# Patient Record
Sex: FEMALE | Race: WHITE | NOT HISPANIC OR LATINO | Employment: FULL TIME | URBAN - METROPOLITAN AREA
[De-identification: names, ages, dates, MRNs, and addresses within clinical notes are randomized per-mention and may not be internally consistent; named-entity substitution may affect disease eponyms.]

---

## 2018-01-07 ENCOUNTER — COMMUNICATION - HEALTHEAST (OUTPATIENT)
Dept: SCHEDULING | Facility: CLINIC | Age: 58
End: 2018-01-07

## 2018-04-02 ENCOUNTER — OFFICE VISIT - HEALTHEAST (OUTPATIENT)
Dept: CARDIOLOGY | Facility: CLINIC | Age: 58
End: 2018-04-02

## 2018-04-02 DIAGNOSIS — R07.9 CHEST PAIN, UNSPECIFIED TYPE: ICD-10-CM

## 2018-04-02 DIAGNOSIS — R01.1 MURMUR: ICD-10-CM

## 2018-04-02 DIAGNOSIS — R06.09 DYSPNEA ON EXERTION: ICD-10-CM

## 2018-04-02 RX ORDER — ALBUTEROL SULFATE 90 UG/1
1-2 AEROSOL, METERED RESPIRATORY (INHALATION) DAILY PRN
Status: SHIPPED | COMMUNITY
Start: 2018-01-08 | End: 2021-08-03

## 2018-04-02 RX ORDER — LANOLIN ALCOHOL/MO/W.PET/CERES
3 CREAM (GRAM) TOPICAL
Status: SHIPPED | COMMUNITY
Start: 2018-04-02 | End: 2021-08-03

## 2018-04-02 ASSESSMENT — MIFFLIN-ST. JEOR: SCORE: 1205.7

## 2018-04-04 LAB
ATRIAL RATE - MUSE: 56 BPM
DIASTOLIC BLOOD PRESSURE - MUSE: NORMAL MMHG
INTERPRETATION ECG - MUSE: NORMAL
P AXIS - MUSE: 53 DEGREES
PR INTERVAL - MUSE: 150 MS
QRS DURATION - MUSE: 82 MS
QT - MUSE: 444 MS
QTC - MUSE: 428 MS
R AXIS - MUSE: 24 DEGREES
SYSTOLIC BLOOD PRESSURE - MUSE: NORMAL MMHG
T AXIS - MUSE: 63 DEGREES
VENTRICULAR RATE- MUSE: 56 BPM

## 2018-05-03 ENCOUNTER — HOSPITAL ENCOUNTER (OUTPATIENT)
Dept: CARDIOLOGY | Facility: HOSPITAL | Age: 58
Discharge: HOME | End: 2018-05-03
Attending: INTERNAL MEDICINE

## 2018-05-03 ENCOUNTER — COMMUNICATION - HEALTHEAST (OUTPATIENT)
Dept: TELEHEALTH | Facility: CLINIC | Age: 58
End: 2018-05-03

## 2018-05-03 LAB
CV STRESS CURRENT BP HE: NORMAL
CV STRESS CURRENT HR HE: 102
CV STRESS CURRENT HR HE: 108
CV STRESS CURRENT HR HE: 112
CV STRESS CURRENT HR HE: 112
CV STRESS CURRENT HR HE: 113
CV STRESS CURRENT HR HE: 118
CV STRESS CURRENT HR HE: 119
CV STRESS CURRENT HR HE: 121
CV STRESS CURRENT HR HE: 122
CV STRESS CURRENT HR HE: 122
CV STRESS CURRENT HR HE: 130
CV STRESS CURRENT HR HE: 132
CV STRESS CURRENT HR HE: 133
CV STRESS CURRENT HR HE: 59
CV STRESS CURRENT HR HE: 62
CV STRESS CURRENT HR HE: 72
CV STRESS CURRENT HR HE: 73
CV STRESS CURRENT HR HE: 74
CV STRESS CURRENT HR HE: 74
CV STRESS CURRENT HR HE: 76
CV STRESS CURRENT HR HE: 83
CV STRESS CURRENT HR HE: 84
CV STRESS CURRENT HR HE: 85
CV STRESS CURRENT HR HE: 86
CV STRESS CURRENT HR HE: 91
CV STRESS CURRENT HR HE: 94
CV STRESS CURRENT HR HE: 96
CV STRESS CURRENT HR HE: 99
CV STRESS DEVIATION TIME HE: NORMAL
CV STRESS ECHO PERCENT HR HE: NORMAL
CV STRESS EXERCISE STAGE HE: NORMAL
CV STRESS FINAL RESTING BP HE: NORMAL
CV STRESS FINAL RESTING HR HE: 74
CV STRESS MAX HR HE: 134
CV STRESS MAX TREADMILL GRADE HE: 16
CV STRESS MAX TREADMILL SPEED HE: 4.2
CV STRESS PEAK DIA BP HE: NORMAL
CV STRESS PEAK SYS BP HE: NORMAL
CV STRESS PHASE HE: NORMAL
CV STRESS PROTOCOL HE: NORMAL
CV STRESS RESTING PT POSITION HE: NORMAL
CV STRESS RESTING PT POSITION HE: NORMAL
CV STRESS ST DEVIATION AMOUNT HE: NORMAL
CV STRESS ST DEVIATION ELEVATION HE: NORMAL
CV STRESS ST EVELATION AMOUNT HE: NORMAL
CV STRESS TEST TYPE HE: NORMAL
CV STRESS TOTAL STAGE TIME MIN 1 HE: NORMAL
ECHO EJECTION FRACTION ESTIMATED: 60 %
STRESS ECHO BASELINE BP: NORMAL
STRESS ECHO BASELINE HR: 60
STRESS ECHO CALCULATED PERCENT HR: 81 %
STRESS ECHO LAST STRESS BP: NORMAL
STRESS ECHO LAST STRESS HR: 133
STRESS ECHO POST ESTIMATED WORKLOAD: 11.7
STRESS ECHO POST EXERCISE DUR MIN: 10
STRESS ECHO POST EXERCISE DUR SEC: 7
STRESS ECHO TARGET HR: 140

## 2018-05-07 ENCOUNTER — AMBULATORY - HEALTHEAST (OUTPATIENT)
Dept: CARDIOLOGY | Facility: CLINIC | Age: 58
End: 2018-05-07

## 2018-05-07 DIAGNOSIS — R06.09 DOE (DYSPNEA ON EXERTION): ICD-10-CM

## 2018-05-07 DIAGNOSIS — R07.9 CHEST PAIN: ICD-10-CM

## 2018-05-07 DIAGNOSIS — R94.39 EQUIVOCAL STRESS TEST: ICD-10-CM

## 2018-05-22 ENCOUNTER — COMMUNICATION - HEALTHEAST (OUTPATIENT)
Dept: CARDIOLOGY | Facility: CLINIC | Age: 58
End: 2018-05-22

## 2018-05-24 ENCOUNTER — HOSPITAL ENCOUNTER (OUTPATIENT)
Dept: CT IMAGING | Facility: CLINIC | Age: 58
Discharge: HOME | End: 2018-05-24
Attending: INTERNAL MEDICINE

## 2018-05-24 DIAGNOSIS — R94.39 EQUIVOCAL STRESS TEST: ICD-10-CM

## 2018-05-24 DIAGNOSIS — R06.09 OTHER FORMS OF DYSPNEA: ICD-10-CM

## 2018-05-24 DIAGNOSIS — R07.9 CHEST PAIN: ICD-10-CM

## 2018-05-24 DIAGNOSIS — R06.09 DOE (DYSPNEA ON EXERTION): ICD-10-CM

## 2018-05-24 LAB
BSA FOR ECHO PROCEDURE: 1.7 M2
CREAT BLD-MCNC: 0.8 MG/DL
CV CALCIUM SCORE AGATSTON LM: 0
CV CALCIUM SCORING AGATSON LAD: 0
CV CALCIUM SCORING AGATSTON CX: 0
CV CALCIUM SCORING AGATSTON RCA: 0
CV CALCIUM SCORING AGATSTON TOTAL: 0
LEFT VENTRICLE HEART RATE: 62 BPM

## 2018-05-24 ASSESSMENT — MIFFLIN-ST. JEOR: SCORE: 1205.7

## 2018-05-31 ENCOUNTER — COMMUNICATION - HEALTHEAST (OUTPATIENT)
Dept: CARDIOLOGY | Facility: CLINIC | Age: 58
End: 2018-05-31

## 2020-07-03 ENCOUNTER — AMBULATORY - HEALTHEAST (OUTPATIENT)
Dept: FAMILY MEDICINE | Facility: CLINIC | Age: 60
End: 2020-07-03

## 2020-07-03 DIAGNOSIS — Z20.822 SUSPECTED COVID-19 VIRUS INFECTION: ICD-10-CM

## 2020-07-06 ENCOUNTER — AMBULATORY - HEALTHEAST (OUTPATIENT)
Dept: FAMILY MEDICINE | Facility: CLINIC | Age: 60
End: 2020-07-06

## 2020-07-06 DIAGNOSIS — Z20.822 SUSPECTED COVID-19 VIRUS INFECTION: ICD-10-CM

## 2020-07-11 ENCOUNTER — COMMUNICATION - HEALTHEAST (OUTPATIENT)
Dept: FAMILY MEDICINE | Facility: CLINIC | Age: 60
End: 2020-07-11

## 2020-11-12 ENCOUNTER — VIRTUAL VISIT (OUTPATIENT)
Dept: FAMILY MEDICINE | Facility: OTHER | Age: 60
End: 2020-11-12

## 2020-11-12 NOTE — PROGRESS NOTES
"Date: 2020 04:20:49  Clinician: Shey Le  Clinician NPI: [id number]  Patient: Sada Pederson  Patient : 1960  Patient Address: [address] [place] [id number]  Patient Phone: [phone]  Visit Protocol: URI  Patient Summary:  Sada is a 59 year old ( : 1960 ) female who initiated a [facility] Visit for COVID-19 (Coronavirus) evaluation and screening. When asked the question \"Please sign me up to receive news, health information and promotions. \", Sada responded \"No\".    Sada states her symptoms started 1-2 days ago.   Her symptoms consist of myalgia, chills, malaise, a sore throat, tooth pain, ear pain, a headache, wheezing, a cough, and nasal congestion.   Symptom details     Nasal secretions: The color of her mucus is yellow.    Cough: Sada coughs almost every minute and her cough is not more bothersome at night. Phlegm does not come into her throat when she coughs. She does not believe her cough is caused by post-nasal drip.     Sore throat: Sada reports having moderate throat pain (4-6 on a 10 point pain scale), does not have exudate on her tonsils, and can swallow liquids. She is not sure if the lymph nodes in her neck are enlarged. A rash has not appeared on the skin since the sore throat started.     Wheezing: Sada has not ever been diagnosed with asthma. Additional wheezing details as reported by the patient (free text): It just started and I have been in bed so it hasn't really affected my daily activities.       Headache: She states the headache is severe (7-9 on a 10 point pain scale).     Tooth pain: The tooth pain is caused by a cavity, recent dental work, or other mouth problems.      Sada denies having vomiting, rhinitis, facial pain or pressure, ageusia, diarrhea, fever, nausea, and anosmia. She also denies taking antibiotic medication in the past month, having recent facial or sinus surgery in the past 60 days, and having a sinus " infection within the past year.   Precipitating events  Sada is not sure if she has been exposed to someone with strep throat. She has not recently been exposed to someone with influenza. Sada has not been in close contact with any high risk individuals.   Pertinent COVID-19 (Coronavirus) information  Sada does not work or volunteer as healthcare worker or a . In the past 14 days, Sada has not worked or volunteered at a healthcare facility or group living setting.   In the past 14 days, she also has not lived in a congregate living setting.   aSda has had a close contact with a laboratory-confirmed COVID-19 patient within 14 days of symptom onset. She was not exposed at her work. She does not know when she was exposed to the laboratory-confirmed COVID-19 patient.   Additional information about contact with COVID-19 (Coronavirus) patient as reported by the patient (free text): I believe that I may have been exposed to my sister and my neighbor in their homes.    Since December 2019, Sada has been tested for COVID-19 and has not had upper respiratory infection or influenza-like illness.      Result of COVID-19 test: Negative     Date of her COVID-19 test: 07/06/2020      Triage Point(s) temporarily suspended for COVID-19 (Coronavirus) screening  Sada reported the following symptoms which were previously protocol referral points. These protocol referral points have temporarily been removed for purposes of COVID-19 (Coronavirus) screening.   Difficulty breathing even when resting and can only speak in phrase(s)   Pertinent medical history  Sada does not get yeast infections when she takes antibiotics.   Sada does not need a return to work/school note.   Weight: 140 lbs   Sada smokes or uses smokeless tobacco.   Weight: 140 lbs    MEDICATIONS: Centrum Silver oral, fluoxetine oral, ALLERGIES: codeine  Clinician Response:  Dear Sada,  Your health is our priority. Based on the  information you have provided, it is possible that you may have some type of viral infection.  Please read the full treatment plan and see my recommendations below.  Medication information  Because you have a viral infection, antibiotics will not help you get better. Treating a viral infection with antibiotics could actually make you feel worse.  I am prescribing:     Ventolin HFA 90 mcg/actuation aerosol inhaler. Inhale 2 puffs every 4-6 hours as needed for 5 days. There are no refills with this prescription.   Self care  Steps you can take to be as comfortable as possible:     Rest.    Drink plenty of fluids.    Take a warm shower to loosen congestion    Use a cool-mist humidifier.    Use throat lozenges.    Suck on frozen items such as popsicles.    Drink hot tea with lemon and honey.    Gargle with warm salt water (1/4 teaspoon of salt per 8 ounce glass of water).    Take a spoonful of honey to reduce your cough.     Also, as your provider, I need you to know that becoming tobacco-free is the most important thing you can do to protect your current and future health.  Call your dentist if you suspect your tooth pain is a dental problem.  Additional treatment plan   Your symptoms show that you may have coronavirus (COVID-19). This illness can cause fever, cough and trouble breathing. Many people get a mild case and get better on their own. Some people can get very sick.  Based on the symptoms you have shared, I would like you to be re-checked in 2 to 3 days. Please call your family clinic to set up a video or phone visit.  Will I be tested for COVID-19?  We would like to test you for this virus.   Please call [phone] to schedule your visit. Explain that you were referred by [facility] to have a COVID-19 test. Be ready to share your [facility] visit ID number.   * If you need to schedule in [place] or [employer] please call [phone] or for [employer] employees please call [phone].    The following will  "serve as your written order for this COVID Test, ordered by me, for the indication of suspected COVID [Z20.828]: The test will be ordered in [employer], our electronic health record, after you are scheduled. It will show as ordered and authorized by Giovanni Rizvi MD.  Order: COVID-19 (Coronavirus) PCR for SYMPTOMATIC testing from [facility].   1.When it's time for your COVID test:   Stay at least 6 feet away from others. (If someone will drive you to your test, stay in the backseat, as far away from the  as you can.)   Cover your mouth and nose with a mask, tissue or washcloth.  Go straight to the testing site. Don't make any stops on the way there or back.      2.Starting now: Stay home and away from others (self-isolate) until:   You've had no fever---and no medicine that reduces fever---for one full day (24 hours). And...   Your other symptoms have gotten better. For example, your cough or breathing has improved. And...   At least 10 days have passed since your symptoms started.       During this time, don't leave the house except for testing or medical care.   Stay in your own room, even for meals. Use your own bathroom if you can.   Stay away from others in your home. No hugging, kissing or shaking hands. No visitors.  Don't go to work, school or anywhere else.    Clean \"high touch\" surfaces often (doorknobs, counters, handles, etc.). Use a household cleaning spray or wipes. You'll find a full list of  on the EPA website: www.epa.gov/pesticide-registration/list-n-disinfectants-use-against-sars-cov-2.   Cover your mouth and nose with a mask, tissue or washcloth to avoid spreading germs.  Wash your hands and face often. Use soap and water.  Caregivers in these groups are at risk for severe illness due to COVID-19:  o People 65 years and older  o People who live in a nursing home or long-term care facility  o People with chronic disease (lung, heart, cancer, diabetes, kidney, liver, immunologic)   o People who " have a weakened immune system, including those who:   Are in cancer treatment  Take medicine that weakens the immune system, such as corticosteroids  Had a bone marrow or organ transplant  Have an immune deficiency  Have poorly controlled HIV or AIDS  Are obese (body mass index of 40 or higher)  Smoke regularly   o Caregivers should wear gloves while washing dishes, handling laundry and cleaning bedrooms and bathrooms.  o Use caution when washing and drying laundry: Don't shake dirty laundry, and use the warmest water setting that you can.  o For more tips, go to www.cdc.gov/coronavirus/2019-ncov/downloads/10Things.pdf.      How can I take care of myself?   Get lots of rest. Drink extra fluids (unless a doctor has told you not to)   Take Tylenol (acetaminophen) for fever or pain. If you have liver or kidney problems, ask your family doctor if it's okay to take Tylenol.   Adults can take either:    650 mg (two 325 mg pills) every 4 to 6 hours, or...   1,000 mg (two 500 mg pills) every 8 hours as needed.    Note: Don't take more than 3,000 mg in one day. Acetaminophen is found in many medicines (both prescribed and over-the-counter medicines). Read all labels to be sure you don't take too much.   For children, check the Tylenol bottle for the right dose. The dose is based on the child's age or weight.    If you have other health problems (like cancer, heart failure, an organ transplant or severe kidney disease): Call your specialty clinic if you don't feel better in the next 2 days.       Know when to call 911. Emergency warning signs include:    Trouble breathing or shortness of breath Pain or pressure in the chest that doesn't go away Feeling confused like you haven't felt before, or not being able to wake up Bluish-colored lips or face  Where can I get more information?    [employer] -- About COVID-19: [email or web address]   CDC -- What to Do If You're Sick:  www.cdc.gov/coronavirus/2019-ncov/about/steps-when-sick.html   CDC -- Ending Home Isolation: www.cdc.gov/coronavirus/2019-ncov/hcp/disposition-in-home-patients.html   CDC -- Caring for Someone: www.cdc.gov/coronavirus/2019-ncov/if-you-are-sick/care-for-someone.html   [facility] -- Interim Guidance for Hospital Discharge to Home: www.health.[facility].[place]./diseases/coronavirus/hcp/hospdischarge.pdf   [facility] clinical trials (COVID-19 research studies): clinicalaffairs.[facility]-clinical-trials    Below are the COVID-19 hotlines at the [place] Department of Health ([facility]). Interpreters are available.    For health questions: Call 042-216-5661 or 1-702.227.4491 (7 a.m. to 7 p.m.) For questions about schools and childcare: Call 294-384-2507 or 1-393.695.5957 (7 a.m. to 7 p.m.)       COVID-19 (Coronavirus) General Information  Because there is currently no vaccine to prevent infection, the best way to protect yourself is to avoid being exposed to this virus. Common symptoms of COVID-19 include but are not limited to fever, cough, and shortness of breath. These symptoms appear 2-14 days after you are exposed to the virus that causes COVID-19. Click here for more information from the CDC on how to protect yourself.  If you are sick with COVID-19 or suspect you are infected with the virus that causes COVID-19, follow the steps here from the CDC to help prevent the disease from spreading to people in your home and community.  Click here for general information from the CDC on testing.  If you develop any of these emergency warning signs for COVID-19, get medical attention immediately:     Trouble breathing    Persistent pain or pressure in the chest    New confusion or inability to arouse    Bluish lips or face      Call your doctor or clinic before going in. Call [phone] if you have a medical emergency and notify the  you have or think you may have COVID-19.  For more detailed and up to date information on  COVID-19 (Coronavirus), please visit the CDC website.   Diagnosis: Cough  Diagnosis ICD: R05  Additional Clinician Notes:  If breathing worsens please go to ER.&nbsp;   Prescription: Ventolin HFA 90 mcg/actuation inhalation HFA aerosol inhaler 1 200 inhalation canister, 5 days supply. Inhale 2 puffs every 4-6 hours as needed for 5 days. Refills: 0, Refill as needed: no, Allow substitutions: yes  Pharmacy: [facility] - [phone] - [address] [place] [address]  Addendum created: November 12 18:37:58, 2020 created by: ANGEL Dorsey body: please call in the following prescription:    Ventolin HFA 90 mcg/actuation aerosol inhaler. Inhale 2 puffs every 4-6 hours as needed for 5 days. There are no refills with this prescription.

## 2020-11-15 ENCOUNTER — AMBULATORY - HEALTHEAST (OUTPATIENT)
Dept: FAMILY MEDICINE | Facility: CLINIC | Age: 60
End: 2020-11-15

## 2020-11-15 DIAGNOSIS — Z20.822 SUSPECTED 2019 NOVEL CORONAVIRUS INFECTION: ICD-10-CM

## 2020-11-17 ENCOUNTER — COMMUNICATION - HEALTHEAST (OUTPATIENT)
Dept: SCHEDULING | Facility: CLINIC | Age: 60
End: 2020-11-17

## 2021-06-01 VITALS — WEIGHT: 143 LBS | HEIGHT: 64 IN | BODY MASS INDEX: 24.41 KG/M2

## 2021-06-01 VITALS — HEIGHT: 64 IN | WEIGHT: 143 LBS | BODY MASS INDEX: 24.41 KG/M2

## 2021-06-13 NOTE — TELEPHONE ENCOUNTER
"Coronavirus (COVID-19) Notification    Caller Name (Patient, parent, daughter/son, grandparent, etc)  Patient     Reason for call  Notify of Positive Coronavirus (COVID-19) lab results, assess symptoms,  review [facility] recommendations    Lab Result    Lab test:  2019-nCoV rRt-PCR or SARS-CoV-2 PCR    Oropharyngeal AND/OR nasopharyngeal swabs is POSITIVE for 2019-nCoV RNA/SARS-COV-2 PCR (COVID-19 virus)    RN Recommendations/Instructions per [facility] Coronavirus COVID-19 recommendations    Brief introduction script  Introduce self and then review script:  \"I am calling on behalf of [facility].  We were notified that your Coronavirus test (COVID-19) for was POSITIVE for the virus.  I have some information to relay to you but first I wanted to mention that the [place] Dept of Health will be contacting you shortly [it's possible MD already called Patient] to talk to you more about how you are feeling and other people you have had contact with who might now also have the virus.  Also, [facility] is Partnering with the [facility] for Covid-19 research, you may be contacted directly by research staff.\"    ssessment (Inquire about Patient's current symptoms)   Assessment   Current Symptoms at time of phone call: (if no symptoms, document No symptoms]  diarrhea, no smell and taste   Symptom onset (if applicable)  11/10/20     If at time of call, Patients symptoms hare worsened, the Patient should contact 911 or have someone drive them to Emergency Dept promptly:      If Patient calling 911, inform 911 personal that you have tested positive for the Coronavirus (COVID-19).  Place mask on and await 911 to arrive.    If Emergency Dept, If possible, please have another adult drive you to the Emergency Dept but you need to wear mask when in contact with other people.      Review information with Patient    Your result was positive. This means you have COVID-19 (coronavirus).  We have sent you a " letter that reviews the information that I'll be reviewing with you now.    How can I protect others?    If you have symptoms: stay home and away from others (self-isolate) until:    You've had no fever--and no medicine that reduces fever--for 1 full day (24 hours). And      Your other symptoms have gotten better. For example, your cough or breathing has improved. And     At least 10 days have passed since your symptoms started. (If you ve been told by a doctor that you have a weak immune system, wait 20 days.)     If you don't have symptoms: Stay home and away from others (self-isolate) until at least 10 days have passed since your first positive COVID-19 test. (Date test collected).    During this time:    Stay in your own room, including for meals. Use your own bathroom if you can.    Stay away from others in your home. No hugging, kissing or shaking hands. No visitors.     Don't go to work, school or anywhere else.     Clean  high touch  surfaces often (doorknobs, counters, handles, etc.). Use a household cleaning spray or wipes. You'll find a full list on the EPA website at www.epa.gov/pesticide-registration/list-n-disinfectants-use-against-sars-cov-2.     Cover your mouth and nose with a mask, tissue or other face covering to avoid spreading germs.    Wash your hands and face often with soap and water.    Caregivers in these groups are at risk for severe illness due to COVID-19:  o People 65 years and older  o People who live in a nursing home or long-term care facility  o People with chronic disease (lung, heart, cancer, diabetes, kidney, liver, immunologic)  o People who have a weakened immune system, including those who:  - Are in cancer treatment  - Take medicine that weakens the immune system, such as corticosteroids  - Had a bone marrow or organ transplant  - Have an immune deficiency  - Have poorly controlled HIV or AIDS  - Are obese (body mass index of 40 or higher)  - Smoke regularly    Caregivers  should wear gloves while washing dishes, handling laundry and cleaning bedrooms and bathrooms.    Wash and dry laundry with special caution. Don't shake dirty laundry, and use the warmest water setting you can.    If you have a weakened immune system, ask your doctor about other actions you should take.    For more tips, go to www.cdc.gov/coronavirus/2019-ncov/downloads/10Things.pdf.    You should not go back to work until you meet the guidelines above for ending your home isolation. You don't need to be retested for COVID-19 before going back to work--studies show that you won't spread the virus if it's been at least 10 days since your symptoms started (or 20 days, if you have a weak immune system).    Employers: This document serves as formal notice of your employee's medical guidelines for going back to work. They must meet the above guidelines before going back to work in person.    How can I take care of myself?    1. Get lots of rest. Drink extra fluids (unless a doctor has told you not to).    2. Take Tylenol (acetaminophen) for fever or pain. If you have liver or kidney problems, ask your family doctor if it's okay to take Tylenol.     Take either:     650 mg (two 325 mg pills) every 4 to 6 hours, or     1,000 mg (two 500 mg pills) every 8 hours as needed.     Note: Don't take more than 3,000 mg in one day. Acetaminophen is found in many medicines (both prescribed and over-the-counter medicines). Read all labels to be sure you don't take too much.    For children, check the Tylenol bottle for the right dose (based on their age or weight).    3. If you have other health problems (like cancer, heart failure, an organ transplant or severe kidney disease): Call your specialty clinic if you don't feel better in the next 2 days.    4. Know when to call 911: Emergency warning signs include:    Trouble breathing or shortness of breath    Pain or pressure in the chest that doesn't go away    Feeling confused like you  haven't felt before, or not being able to wake up    Bluish-colored lips or face    5. Sign up for [employer]. We know it's scary to hear that you have COVID-19. We want to track your symptoms to make sure you're okay over the next 2 weeks. Please look for an email from [employer]--this is a free, online program that we'll use to keep in touch. To sign up, follow the link in the email. Learn more at [email or web address].    Where can I get more information?    [facility]: [email or web address]    Coronavirus Basics: www.health.[facility].[place]./diseases/coronavirus/basics.html    What to Do If You're Sick: www.cdc.gov/coronavirus/2019-ncov/about/steps-when-sick.html    Ending Home Isolation: www.cdc.gov/coronavirus/2019-ncov/hcp/disposition-in-home-patients.html     Caring for Someone with COVID-19: www.cdc.gov/coronavirus/2019-ncov/if-you-are-sick/care-for-someone.html     [facility] clinical trials (COVID-19 research studies): clinicalaffairs.[facility]-clinical-trials     A Positive COVID-19 letter will be sent via [employer] or the Mail.  (Exception, no letters sent to Presurgerical/Preprocedure Patients)    [Name]  Olivia Luevano RN  [facility]  COVID19 Results Team RN  Ph# [phone]

## 2021-06-20 NOTE — LETTER
Letter by Lesli Maxwell LPN at      Author: Lesli Maxwell LPN Service: -- Author Type: --    Filed:  Encounter Date: 7/11/2020 Status: (Other)       7/11/2020        Sada Pederson  [address] [facility] [address]    This letter provides a written record that you were tested for COVID-19 on 7/6/2020.     Your result was negative. This means that we didnt find the virus that causes COVID-19 in your sample. A test may show negative when you do actually have the virus. This can happen when the virus is in the early stages of infection, before you feel illness symptoms.    If you have symptoms   Stay home and away from others (self-isolate) until you meet ALL of the guidelines below:    Youve had no fever--and no medicine that reduces fever--for 3 full days (72 hours). And ?    Your other symptoms have gotten better. For example, your cough or breathing has improved. And?    At least 10 days have passed since your symptoms started.    During this time:    Stay home. Dont go to work, school or anywhere else.     Stay in your own room, including for meals. Use your own bathroom if you can.    Stay away from others in your home. No hugging, kissing or shaking hands. No visitors.    Clean high touch surfaces often (doorknobs, counters, handles, etc.). Use a household cleaning spray or wipes. You can find a full list on the EPA website at www.epa.gov/pesticide-registration/list-n-disinfectants-use-against-sars-cov-2.    Cover your mouth and nose with a mask, tissue or washcloth to avoid spreading germs.    Wash your hands and face often with soap and water.    Going back to work  Check with your employer for any guidelines to follow for going back to work.    Employers: This document serves as formal notice that your employee tested negative for COVID-19, as of the testing date shown above.

## 2021-06-26 NOTE — PROGRESS NOTES
"Progress Notes by Danis Weir MD at 4/2/2018  3:30 PM     Author: Danis Weir MD Service: -- Author Type: Physician    Filed: 4/2/2018  4:18 PM Encounter Date: 4/2/2018 Status: Signed    : Danis Weir MD (Physician)           Click to link to [facility] Consultation Note    Thank you, Dr. Jose Ochoa, for asking Sada Pederson to meet with me in consultation today at the [facility] to evaluate a cardiac murmur.     Assessment:    1. Chest pain, unspecified type  ECG 12 lead with MUSE    Echo Stress Exercise   2. Dyspnea on exertion     3. Murmur         Plan:    We will call Bisi with the results of her exercise echocardiographic stress test.    An After Visit Summary was printed and given to the patient.    Current History:    Bisi came to see me today at the request of her gynecologist.  She states he heard a cardiac murmur during her recent visit.  She states she has been under quite a bit of personal stress in the last several months.  Her  had been incarcerated.  She mentions him being incarcerated 2 or 3 times in the last 20 years and states it is related to a \"business problem in another state\".  She reports that getting used to having him back in her home, financial problems, and having to move multiple times in the last few years have all been stressful for her.    She describes unpredictable fleeting pains in the chest that are not related to exertion.  When she has these symptoms she does not experience diaphoresis, nausea or weakness.  She has frequent numbness in her right hand that is not related to the chest discomfort.      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  She reports shortness of breath with exertion which has not changed over the last year.  She does not experience palpitations, lightheadedness or " syncope.  Her system review otherwise has numerous positives as described below.    There is no problem list on file for this patient.      Past Medical History:  Past Medical History:   Diagnosis Date   ? Depression        Past Surgical History:  Past Surgical History:   Procedure Laterality Date   ? CHOLECYSTECTOMY     ? SHOULDER ARTHROSCOPY W/ ACROMIAL REPAIR Right        Family History:  Family History   Problem Relation Age of Onset   ? Dementia Mother 78   ? Depression Son    ? Diabetes Neg Hx    ? Heart disease Neg Hx    ? Stroke Neg Hx        Social History:   reports that she has been smoking Cigarettes.  She has a 20.00 pack-year smoking history. She has never used smokeless tobacco. She reports that she drinks about 8.4 oz of alcohol per week  She reports that she uses illicit drugs, including Cocaine.    Medications:  Outpatient Encounter Prescriptions as of 4/2/2018   Medication Sig Dispense Refill   ? albuterol (PROAIR HFA;PROVENTIL HFA;VENTOLIN HFA) 90 mcg/actuation inhaler Inhale 1-2 puffs daily as needed.     ? aspirin-acetaminophen-caffeine (EXCEDRIN MIGRAINE) 250-250-65 mg per tablet Take 2 tablets by mouth daily.     ? melatonin 3 mg Tab tablet Take 3 mg by mouth at bedtime as needed.     ? MULTIVIT WITH MINERALS/LUTEIN (MULTIVITAMIN 50 PLUS ORAL) Take 1 tablet by mouth daily.     ? [DISCONTINUED] FLUoxetine (PROZAC) 20 MG capsule Take 20 mg by mouth daily.     ? FLUoxetine (PROZAC) 20 MG tablet Take 1 tablet (20 mg total) by mouth every morning. 30 tablet 0   ? [DISCONTINUED] oxyCODONE-acetaminophen (PERCOCET) 5-325 mg per tablet Take 1 tablet by mouth every 4 (four) hours as needed for pain. 20 tablet 0     No facility-administered encounter medications on file as of 4/2/2018.        Allergies:  Codeine    Review of Systems:     General: Night Sweats  Eyes: Visual Distubance  Ears/Nose/Throat: Hearing Loss  Lungs: Shortness of Breath, Snoring, Wheezing  Heart: Chest Pain, Arm Pain, Shortness of  "Breath with activity  Stomach: Heartburn  Bladder: WNL  Muscle/Joints: Joint Pain  Skin: WNL  Nervous System: WNL  Mental Health: Depression, Anxiety     Blood: Easy Bleeding, Easy Bruising    Objective:     Physical Exam:  Wt Readings from Last 5 Encounters:   04/02/18 143 lb (64.9 kg)   01/17/18 140 lb (63.5 kg)   03/24/16 135 lb (61.2 kg)      5' 3.5\" (1.613 m)  Body mass index is 24.93 kg/(m^2).  /54 (Patient Site: Left Arm, Patient Position: Sitting, Cuff Size: Adult Regular)  Pulse 60  Resp 16  Ht 5' 3.5\" (1.613 m)  Wt 143 lb (64.9 kg)  BMI 24.93 kg/m2    General Appearance: Alert and not in distress   HEENT: No scleral icterus; the mucous membranes are pink and moist   Neck: No cervical bruits, adenopathy, or thyromegaly; jugular venous pressure is less than 5 cm   Chest: The spine is straight and the chest is symmetric   Lungs: Respirations are unlabored; the lungs are clear to auscultation   Cardiovasular: Auscultation reveals normal first and second heart sounds with grade 1/6 early systolic murmur but no rubs or gallops; the carotid, radial, femoral, and posterior tibial pulses are intact   Abdomen: No organomegaly, masses, bruits, or tenderness; bowel sounds are present   Extremities: No cyanosis, clubbing or edema   Skin: No xanthelasma   Neurologic: Mood and affect are appropriate       Cardiac testing:    EKG: Sinus bradycardia, normal EKG per my personal review.    Imaging:    No results found.    Lab Review:    Lab Results   Component Value Date     01/17/2018    K 4.0 01/17/2018     01/17/2018    CO2 25 01/17/2018    BUN 13 01/17/2018    CREATININE 0.77 01/17/2018    CALCIUM 9.2 01/17/2018     Lab Results   Component Value Date    WBC 10.4 01/17/2018    HGB 13.5 01/17/2018    HCT 38.7 01/17/2018    MCV 95 01/17/2018     01/17/2018     Creatinine (mg/dL)   Date Value   01/17/2018 0.77           Much or all of the text in this note was generated through the use of the " Dragon Dictate voice-to-text software. Errors in spelling or words which seem out of context are unintentional. Sound alike errors, in particular, may have escaped editing.

## 2021-08-02 ENCOUNTER — HOSPITAL ENCOUNTER (EMERGENCY)
Facility: CLINIC | Age: 61
Discharge: LEFT BEFORE DISCHARGE | End: 2021-08-02
Payer: COMMERCIAL

## 2021-08-03 ENCOUNTER — APPOINTMENT (OUTPATIENT)
Dept: CT IMAGING | Facility: CLINIC | Age: 61
End: 2021-08-03
Attending: STUDENT IN AN ORGANIZED HEALTH CARE EDUCATION/TRAINING PROGRAM
Payer: COMMERCIAL

## 2021-08-03 ENCOUNTER — HOSPITAL ENCOUNTER (OUTPATIENT)
Facility: CLINIC | Age: 61
Setting detail: OBSERVATION
Discharge: HOME | End: 2021-08-04
Attending: STUDENT IN AN ORGANIZED HEALTH CARE EDUCATION/TRAINING PROGRAM | Admitting: STUDENT IN AN ORGANIZED HEALTH CARE EDUCATION/TRAINING PROGRAM
Payer: COMMERCIAL

## 2021-08-03 DIAGNOSIS — K92.2 UPPER GI BLEED: ICD-10-CM

## 2021-08-03 LAB
ABO/RH(D): NORMAL
ALBUMIN SERPL-MCNC: 3.7 G/DL (ref 3.5–5)
ALP SERPL-CCNC: 87 U/L (ref 45–120)
ALT SERPL W P-5'-P-CCNC: 18 U/L (ref 0–45)
ANION GAP SERPL CALCULATED.3IONS-SCNC: 12 MMOL/L (ref 5–18)
ANTIBODY SCREEN: NEGATIVE
APTT PPP: 25 SECONDS (ref 22–38)
AST SERPL W P-5'-P-CCNC: 18 U/L (ref 0–40)
BASOPHILS # BLD AUTO: 0.1 10E3/UL (ref 0–0.2)
BASOPHILS NFR BLD AUTO: 1 %
BILIRUB SERPL-MCNC: 0.4 MG/DL (ref 0–1)
BUN SERPL-MCNC: 12 MG/DL (ref 8–22)
CALCIUM SERPL-MCNC: 9.2 MG/DL (ref 8.5–10.5)
CHLORIDE BLD-SCNC: 107 MMOL/L (ref 98–107)
CO2 SERPL-SCNC: 22 MMOL/L (ref 22–31)
CREAT SERPL-MCNC: 0.74 MG/DL (ref 0.6–1.1)
EOSINOPHIL # BLD AUTO: 0.1 10E3/UL (ref 0–0.7)
EOSINOPHIL NFR BLD AUTO: 1 %
ERYTHROCYTE [DISTWIDTH] IN BLOOD BY AUTOMATED COUNT: 12.5 % (ref 10–15)
ETHANOL SERPL-MCNC: <10 MG/DL
GFR SERPL CREATININE-BSD FRML MDRD: 90 ML/MIN/1.73M2
GLUCOSE BLD-MCNC: 95 MG/DL (ref 70–125)
HCT VFR BLD AUTO: 37 % (ref 35–47)
HGB BLD-MCNC: 11.4 G/DL (ref 11.7–15.7)
HGB BLD-MCNC: 12.2 G/DL (ref 11.7–15.7)
HGB BLD-MCNC: 12.4 G/DL (ref 11.7–15.7)
HGB BLD-MCNC: 12.5 G/DL (ref 11.7–15.7)
IMM GRANULOCYTES # BLD: 0 10E3/UL
IMM GRANULOCYTES NFR BLD: 0 %
INR PPP: 0.92 (ref 0.85–1.15)
INTERNAL QC CHECK POCT: ABNORMAL
LIPASE SERPL-CCNC: 23 U/L (ref 0–52)
LYMPHOCYTES # BLD AUTO: 2.2 10E3/UL (ref 0.8–5.3)
LYMPHOCYTES NFR BLD AUTO: 39 %
MCH RBC QN AUTO: 33.1 PG (ref 26.5–33)
MCHC RBC AUTO-ENTMCNC: 33.8 G/DL (ref 31.5–36.5)
MCV RBC AUTO: 98 FL (ref 78–100)
MONOCYTES # BLD AUTO: 0.7 10E3/UL (ref 0–1.3)
MONOCYTES NFR BLD AUTO: 12 %
NEUTROPHILS # BLD AUTO: 2.7 10E3/UL (ref 1.6–8.3)
NEUTROPHILS NFR BLD AUTO: 47 %
NRBC # BLD AUTO: 0 10E3/UL
NRBC BLD AUTO-RTO: 0 /100
OCCULT BLOOD POCT: POSITIVE
PLATELET # BLD AUTO: 192 10E3/UL (ref 150–450)
POTASSIUM BLD-SCNC: 4.1 MMOL/L (ref 3.5–5)
PROT SERPL-MCNC: 6.9 G/DL (ref 6–8)
RBC # BLD AUTO: 3.78 10E6/UL (ref 3.8–5.2)
SARS-COV-2 RNA RESP QL NAA+PROBE: NEGATIVE
SODIUM SERPL-SCNC: 141 MMOL/L (ref 136–145)
SPECIMEN EXPIRATION DATE: NORMAL
TEST CARD EXPIRATION DATE: ABNORMAL
TEST CARD LOT NUMBER: ABNORMAL
WBC # BLD AUTO: 5.8 10E3/UL (ref 4–11)

## 2021-08-03 PROCEDURE — 80053 COMPREHEN METABOLIC PANEL: CPT | Performed by: STUDENT IN AN ORGANIZED HEALTH CARE EDUCATION/TRAINING PROGRAM

## 2021-08-03 PROCEDURE — G0378 HOSPITAL OBSERVATION PER HR: HCPCS

## 2021-08-03 PROCEDURE — 85025 COMPLETE CBC W/AUTO DIFF WBC: CPT | Performed by: STUDENT IN AN ORGANIZED HEALTH CARE EDUCATION/TRAINING PROGRAM

## 2021-08-03 PROCEDURE — 85018 HEMOGLOBIN: CPT | Mod: 91 | Performed by: INTERNAL MEDICINE

## 2021-08-03 PROCEDURE — 250N000011 HC RX IP 250 OP 636: Performed by: STUDENT IN AN ORGANIZED HEALTH CARE EDUCATION/TRAINING PROGRAM

## 2021-08-03 PROCEDURE — C9113 INJ PANTOPRAZOLE SODIUM, VIA: HCPCS | Performed by: INTERNAL MEDICINE

## 2021-08-03 PROCEDURE — 96374 THER/PROPH/DIAG INJ IV PUSH: CPT | Mod: 59

## 2021-08-03 PROCEDURE — 250N000013 HC RX MED GY IP 250 OP 250 PS 637: Performed by: INTERNAL MEDICINE

## 2021-08-03 PROCEDURE — 85730 THROMBOPLASTIN TIME PARTIAL: CPT | Performed by: STUDENT IN AN ORGANIZED HEALTH CARE EDUCATION/TRAINING PROGRAM

## 2021-08-03 PROCEDURE — 96375 TX/PRO/DX INJ NEW DRUG ADDON: CPT

## 2021-08-03 PROCEDURE — 86901 BLOOD TYPING SEROLOGIC RH(D): CPT | Performed by: STUDENT IN AN ORGANIZED HEALTH CARE EDUCATION/TRAINING PROGRAM

## 2021-08-03 PROCEDURE — C9113 INJ PANTOPRAZOLE SODIUM, VIA: HCPCS | Performed by: STUDENT IN AN ORGANIZED HEALTH CARE EDUCATION/TRAINING PROGRAM

## 2021-08-03 PROCEDURE — 85610 PROTHROMBIN TIME: CPT | Performed by: STUDENT IN AN ORGANIZED HEALTH CARE EDUCATION/TRAINING PROGRAM

## 2021-08-03 PROCEDURE — 99285 EMERGENCY DEPT VISIT HI MDM: CPT | Mod: 25

## 2021-08-03 PROCEDURE — 83690 ASSAY OF LIPASE: CPT | Performed by: STUDENT IN AN ORGANIZED HEALTH CARE EDUCATION/TRAINING PROGRAM

## 2021-08-03 PROCEDURE — 87635 SARS-COV-2 COVID-19 AMP PRB: CPT | Performed by: STUDENT IN AN ORGANIZED HEALTH CARE EDUCATION/TRAINING PROGRAM

## 2021-08-03 PROCEDURE — 96361 HYDRATE IV INFUSION ADD-ON: CPT

## 2021-08-03 PROCEDURE — 82077 ASSAY SPEC XCP UR&BREATH IA: CPT | Performed by: STUDENT IN AN ORGANIZED HEALTH CARE EDUCATION/TRAINING PROGRAM

## 2021-08-03 PROCEDURE — 36415 COLL VENOUS BLD VENIPUNCTURE: CPT | Performed by: STUDENT IN AN ORGANIZED HEALTH CARE EDUCATION/TRAINING PROGRAM

## 2021-08-03 PROCEDURE — 36415 COLL VENOUS BLD VENIPUNCTURE: CPT | Performed by: INTERNAL MEDICINE

## 2021-08-03 PROCEDURE — 258N000003 HC RX IP 258 OP 636: Performed by: INTERNAL MEDICINE

## 2021-08-03 PROCEDURE — 99220 PR INITIAL OBSERVATION CARE,LEVEL III: CPT | Performed by: INTERNAL MEDICINE

## 2021-08-03 PROCEDURE — 74174 CTA ABD&PLVS W/CONTRAST: CPT

## 2021-08-03 PROCEDURE — 250N000013 HC RX MED GY IP 250 OP 250 PS 637: Performed by: HOSPITALIST

## 2021-08-03 PROCEDURE — 82272 OCCULT BLD FECES 1-3 TESTS: CPT | Performed by: STUDENT IN AN ORGANIZED HEALTH CARE EDUCATION/TRAINING PROGRAM

## 2021-08-03 PROCEDURE — C9803 HOPD COVID-19 SPEC COLLECT: HCPCS

## 2021-08-03 PROCEDURE — 250N000011 HC RX IP 250 OP 636: Performed by: INTERNAL MEDICINE

## 2021-08-03 RX ORDER — FLUOXETINE 40 MG/1
40 CAPSULE ORAL EVERY EVENING
COMMUNITY
Start: 2021-05-11

## 2021-08-03 RX ORDER — ONDANSETRON 4 MG/1
4 TABLET, ORALLY DISINTEGRATING ORAL EVERY 6 HOURS PRN
Status: DISCONTINUED | OUTPATIENT
Start: 2021-08-03 | End: 2021-08-04 | Stop reason: HOSPADM

## 2021-08-03 RX ORDER — ONDANSETRON 2 MG/ML
4 INJECTION INTRAMUSCULAR; INTRAVENOUS EVERY 6 HOURS PRN
Status: DISCONTINUED | OUTPATIENT
Start: 2021-08-03 | End: 2021-08-04 | Stop reason: HOSPADM

## 2021-08-03 RX ORDER — IOPAMIDOL 755 MG/ML
100 INJECTION, SOLUTION INTRAVASCULAR ONCE
Status: COMPLETED | OUTPATIENT
Start: 2021-08-03 | End: 2021-08-03

## 2021-08-03 RX ORDER — SODIUM CHLORIDE 9 MG/ML
INJECTION, SOLUTION INTRAVENOUS CONTINUOUS
Status: DISCONTINUED | OUTPATIENT
Start: 2021-08-03 | End: 2021-08-04 | Stop reason: HOSPADM

## 2021-08-03 RX ORDER — FERROUS SULFATE 325(65) MG
325 TABLET ORAL
COMMUNITY

## 2021-08-03 RX ORDER — MAGNESIUM HYDROXIDE/ALUMINUM HYDROXICE/SIMETHICONE 120; 1200; 1200 MG/30ML; MG/30ML; MG/30ML
30 SUSPENSION ORAL
Status: DISCONTINUED | OUTPATIENT
Start: 2021-08-03 | End: 2021-08-04 | Stop reason: HOSPADM

## 2021-08-03 RX ORDER — DIPHENHYDRAMINE HCL 25 MG
25 TABLET ORAL DAILY
COMMUNITY

## 2021-08-03 RX ORDER — CALCIUM CARBONATE 500 MG/1
500 TABLET, CHEWABLE ORAL DAILY PRN
Status: DISCONTINUED | OUTPATIENT
Start: 2021-08-03 | End: 2021-08-04 | Stop reason: HOSPADM

## 2021-08-03 RX ADMIN — PANTOPRAZOLE SODIUM 40 MG: 40 INJECTION, POWDER, FOR SOLUTION INTRAVENOUS at 07:49

## 2021-08-03 RX ADMIN — ONDANSETRON 4 MG: 2 INJECTION INTRAMUSCULAR; INTRAVENOUS at 22:22

## 2021-08-03 RX ADMIN — SODIUM CHLORIDE: 9 INJECTION, SOLUTION INTRAVENOUS at 15:46

## 2021-08-03 RX ADMIN — PANTOPRAZOLE SODIUM 40 MG: 40 INJECTION, POWDER, FOR SOLUTION INTRAVENOUS at 19:44

## 2021-08-03 RX ADMIN — FLUOXETINE 40 MG: 20 CAPSULE ORAL at 21:20

## 2021-08-03 RX ADMIN — SODIUM CHLORIDE: 9 INJECTION, SOLUTION INTRAVENOUS at 23:42

## 2021-08-03 RX ADMIN — CALCIUM CARBONATE (ANTACID) CHEW TAB 500 MG 500 MG: 500 CHEW TAB at 22:40

## 2021-08-03 RX ADMIN — IOPAMIDOL 100 ML: 755 INJECTION, SOLUTION INTRAVENOUS at 09:06

## 2021-08-03 ASSESSMENT — MIFFLIN-ST. JEOR
SCORE: 1168.74
SCORE: 1167.15

## 2021-08-03 ASSESSMENT — ACTIVITIES OF DAILY LIVING (ADL): DEPENDENT_IADLS:: INDEPENDENT

## 2021-08-03 NOTE — PHARMACY-ADMISSION MEDICATION HISTORY
Pharmacy Note - Admission Medication History    Pertinent Provider Information: None     ______________________________________________________________________    Prior To Admission (PTA) med list completed and updated in EMR.       PTA Med List   Medication Sig Last Dose     aspirin-acetaminophen-caffeine (EXCEDRIN MIGRAINE) 250-250-65 mg per tablet Take 1 tablet by mouth daily as needed for headaches  8/2/2021 at Unknown time     diphenhydrAMINE (BENADRYL) 25 MG tablet Take 25 mg by mouth daily For allergies 8/2/2021 at Unknown time     ferrous sulfate (FEROSUL) 325 (65 Fe) MG tablet Take 325 mg by mouth daily (with breakfast) 8/2/2021 at Unknown time     FLUoxetine (PROZAC) 40 MG capsule Take 40 mg by mouth every evening 8/2/2021 at Unknown time     MULTIVIT WITH MINERALS/LUTEIN (MULTIVITAMIN 50 PLUS ORAL) [MULTIVIT WITH MINERALS/LUTEIN (MULTIVITAMIN 50 PLUS ORAL)] Take 1 tablet by mouth daily. 8/2/2021 at Unknown time       Information source(s): Patient and Clinic records  Method of interview communication: in-person    Summary of Changes to PTA Med List  New: None  Discontinued: None  Changed: None    Patient was asked about OTC/herbal products specifically.  PTA med list reflects this.    In the past week, patient estimated taking medication this percent of the time:  greater than 90%.    Allergies were reviewed, assessed, and updated with the patient.      Patient does not use any multi-dose medications prior to admission.    The information provided in this note is only as accurate as the sources available at the time of the update(s).    Thank you for the opportunity to participate in the care of this patient.    Juno Callejas RPH  8/3/2021 8:47 AM

## 2021-08-03 NOTE — CONSULTS
"GI CONSULT NOTE      Name: Sada Pederson    Medical Record #: [id number]    YOB: 1960    Date: 8/3/2021    Referring Provider: Devante Acosta MD    Reason for Consultation: GI bleed.     CC: We were consulted by Devante Acosta MD to see Sada Pederson in regards to GI bleed.     HPI: This is a 60 year old female with a history of tobacco use, anxiety, hypertension who presented with concerns of upper GI bleeding.  Reports she has been having melena 1-2 times per day for the past couple of weeks.  Her hemoglobin was 12.5 upon presentation.  She has taken Excedrin daily for several years.  In the past month she started to use naproxen once a day.  Pt felt bloated on Sunday, then vomited later in the day and vomited again yesterday.  Reports emesis was \"brownish\" but denies coffee ground appearance to the emesis.  She had periumbilical pain on Sunday that did not have any change with eating. Reports that she feels pain when she presses on her abdomen but otherwise not very much.  She also reports dysphagia which has been going on for 4-5 months.  Reports feeling like foods gets stuck and she tries to cough it up.  Has been cutting steak and meat into small pieces.  Describes a sensation as if her \"throat is shrinking\".  After she vomited on Sunday, she felt like she could swallow easier.  Also reports pills cause her to gag and has to take them one at a time rather than take a handful like she used to in the past.  Has history of intermittent heartburn. Denies any weight loss.  Has started to drink more during COVID19 and was consuming up to 6-7 drinks per day. Went to get support from her [facility], decreased EtOH use, then became re-employed and has started to consume EtOH again -- estimating 1-2 beers or hard lemonade per day.  She has never had an EGD or colonoscopy.    Past medical history  Alcohol misuse.  Tobacco abuse.     Family history  Family History   Problem Relation Age of Onset     Dementia Mother " "78.00     Depression Son      Diabetes No family hx of      Heart Disease No family hx of      Cerebrovascular Disease No family hx of         Social history  Social History     Tobacco Use     Smoking status: Current Every Day Smoker     Packs/day: 0.50     Years: 40.00     Pack years: 20.00     Types: Cigarettes, Cigarettes     Smokeless tobacco: Never Used   Substance Use Topics     Alcohol use: Yes     Alcohol/week: 14.0 standard drinks     Drug use: Yes     Types: Cocaine       Medications:   Current Outpatient Medications   Medication Sig Dispense Refill     aspirin-acetaminophen-caffeine (EXCEDRIN MIGRAINE) 250-250-65 mg per tablet Take 1 tablet by mouth daily as needed for headaches        diphenhydrAMINE (BENADRYL) 25 MG tablet Take 25 mg by mouth daily For allergies       ferrous sulfate (FEROSUL) 325 (65 Fe) MG tablet Take 325 mg by mouth daily (with breakfast)       FLUoxetine (PROZAC) 40 MG capsule Take 40 mg by mouth every evening       MULTIVIT WITH MINERALS/LUTEIN (MULTIVITAMIN 50 PLUS ORAL) [MULTIVIT WITH MINERALS/LUTEIN (MULTIVITAMIN 50 PLUS ORAL)] Take 1 tablet by mouth daily.            Allergies:    Allergies   Allergen Reactions     Codeine Nausea and Vomiting          REVIEW OF SYSTEMS (ROS): Review of systems is as per HPI.  Remainder of complete review of systems is negative.      PHYSICAL EXAMINATION:      /60   Pulse 57   Temp 99  F (37.2  C) (Oral)   Resp 18   Ht 1.6 m (5' 3\")   Wt 62 kg (136 lb 9.6 oz)   SpO2 99%   Breastfeeding No   BMI 24.20 kg/m    GEN: Well developed, well nourished 60 year old female in no acute distress.  HEENT: sclera anicteric, moist mucous membranes, neck soft and supple.  LYMPH: No cervical lymphadenopathy  PULM: lungs clear to auscultation bilaterally.  CARDIO: Regular rate and rhythm  GI: Non-distended.  Bowel sounds positive.  Soft.  Non-tender to palpation.  No guarding.  EXT: warm, no lower extremity edema  NEURO: Alert and oriented.  Speech " fluid.    PSYCH: Mental status appropriate, mood and affect normal.      LABS and IMAGING  Recent Labs   Lab 08/03/21  0743   WBC 5.8   RBC 3.78*   HGB 12.5   HCT 37.0   MCV 98   MCH 33.1*   MCHC 33.8   RDW 12.5         Recent Labs   Lab 08/03/21  0743      CO2 22   BUN 12     Recent Labs   Lab 08/03/21  0743   ALKPHOS 87   AST 18   ALT 18     Lab Results   Component Value Date    INR 0.92 08/03/2021    INR 0.92 01/17/2018       CTA Abdomen Pelvis with Contrast    Result Date: 8/3/2021  EXAM: CTA ABDOMEN PELVIS WITH CONTRAST LOCATION: [facility] DATE/TIME: 8/3/2021 9:00 AM INDICATION: Upper GI bleed. Coffee-ground emesis. Black tarry stools. COMPARISON: None. TECHNIQUE: CT angiogram abdomen pelvis during arterial phase of injection of IV contrast. 2D and 3D MIP reconstructions were performed by the CT technologist. Dose reduction techniques were used. CONTRAST: Isovue-370 100mL FINDINGS: ANGIOGRAM ABDOMEN/PELVIS: Mild atherosclerotic plaque throughout the aorta. No aneurysms. No stenoses in the aorta or iliac arteries. Widely patent great vessels off the abdominal aorta with separate origin of the hepatic artery which is a normal variation. Widely patent renal arteries. LOWER CHEST: Normal. HEPATOBILIARY: Hyperenhancing 2.5 cm lesion segment 3 of the liver blends in with the liver on the portal venous phase. Characteristics typical of focal nodular hyperplasia. PANCREAS: Normal. SPLEEN: Normal. ADRENAL GLANDS: Normal. KIDNEYS/BLADDER: Normal. BOWEL: There is a few loops of jejunum with slightly thickened folds most suggestive of some mild enteritis. Nothing for active GI bleeding. The remainder of the bowel loops are normal. LYMPH NODES: Normal. PELVIC ORGANS: Normal. MUSCULOSKELETAL: Normal.     IMPRESSION: 1.  Mildly thickened loops of proximal jejunum most suggestive of nonspecific enteritis. Nothing for active GI bleeding. Bowel loops otherwise normal. 2.  Indeterminant 2.5  cm liver lesion segment 3 features most suggestive of a benign focal nodular hyperplasia. MRI the liver using Eovist contrast enhancement would be more definitive. 3.  No other significant findings. NOTE: ABNORMAL REPORT THE DICTATION ABOVE DESCRIBES AN ABNORMALITY FOR WHICH FOLLOW-UP IS NEEDED.      ASSESSMENT  1. Melena.  Reports this is been present for the past couple months although ZEFERINO reveals light tan stool.  Hemoglobin normal at admission.  Has used Excedrin for months and added Aleve the past month.  Could have NSAID related ulcer, peptic ulcer, gastritis, esophagitis, AVM, [place]ulafoy; cannot exclude malignancy.  2. Dysphagia.  Occurs with solids and pills.  With EGD will get esophageal biopsies.  If EGD does not find anything to explain the dysphagia, can then consider outpatient manometry.   3.  Alcohol misuse.  Has reached out to her [place] for support in the past.  4.Tobacco abuse    PLAN  1. EGD with gastric and esophageal biopsies later this week as outpatient at [facility]  2. PPI BID   3.  Stop NSAIDs including Excedrin and Aleve.  Tylenol is okay  4. Monitor stools and hemoglobin.    5.  Recommend alcohol treatment program    Thank you for asking to consult on this patient.    Jatin Craig PA-C PA-C    [facility] GI attending addendum  Pt seen, examined and discussed with ADAIR.  Agree with assessment and plan above.   Reports melena off and on over the past couple weeks.  Describes 2 episodes of emesis over past 3 days but not clearly hematemesis.  Hemoglobin normal with recheck and VSS.  Stool is light tan on ZEFERINO.  Has used regular Excedrin and added in Aleve over the past month.  Alcohol abuse including 6 drinks on Sunday.     PE: VS reviewed; Lungs CTA; CV RRR; ABD positive bowel sounds soft nontender no hepatosplenomegaly; Neuro MS intact     Assessment/Plan  No signs of ongoing GI bleeding with light tan stool, normal hemoglobin and normal vital signs.  She can return home and we will plan on EGD later  this week as an outpatient.  Needs to stop the NSAIDs and alcohol.  Plan on PPI for the next month or 2 and then stop.  Should undergo gastric and esophageal biopsies with upcoming EGD.   Is reporting some solid food dysphagia and should have esophageal biopsies.      Yasmany Thomas MD  8/3/2021/4:14 PM

## 2021-08-03 NOTE — ED TRIAGE NOTES
Patient reports bloating for years but current episode since Thursday. She has coffee ground emesis and black, tarry stools. Abdominal pain with palpation and coughing, at rest 2-3/10. 6 vodka drinks per day until 2 weeks ago, now 1-2 beers per day

## 2021-08-03 NOTE — ED PROVIDER NOTES
Emergency Department Encounter         FINAL IMPRESSION:  Upper GI bleed        ED COURSE AND MEDICAL DECISION MAKING   7:39 AM Met with patient for initial interview and exam. Plan for care in the ED was discussed. I saw the patient wearing a surgical mask and gloves.   11:45 AM Spoke to Dr. Thomas with GI regarding the patient's case.   12:45 PM I spoke with Dr. Short with the hospitalist service who agrees to admit the patient.      ED Course as of Aug 03 1245   Tue Aug 03, 2021   0742 Patient is a 60-year-old alcoholic with a history of hypertension and anxiety here with upper GI bleed symptoms since Sunday.  Endorsing coffee-ground emesis, black tarry stools.  No fevers, chills, chest pain or trouble breathing.  Normal urination.  Last drink was yesterday.  On arrival her vitals are stable.  She looks well clinically.  Generalized abdominal common palpation.  Heart and lungs normal.  Rectal examination feeling dark stool.  Hemoccult pending.  Concern for upper GI bleed.  No history of variceal bleeds.  No history of EGDs in the past.  Disposition most likely admission.          -Labs are reassuring.  Patient still some mild abdominal pain.  Discussion with GI who will see patient as an inpatient.  Discussed case with hospitalist.  PPI infusing.  Patient otherwise hemodynamically stable at this time.  Plan for observation admission to start                         At the conclusion of the encounter I discussed the results of all the tests and the disposition. The questions were answered. The patient or family acknowledged understanding and was agreeable with the care plan.                  MEDICATIONS GIVEN IN THE EMERGENCY DEPARTMENT:  Medications   iohexol (OMNIPAQUE) solution 25 mL ( Oral Canceled Entry 8/3/21 0910)   pantoprazole (PROTONIX) IV push injection 40 mg (40 mg Intravenous Given 8/3/21 3446)   iopamidol (ISOVUE-370) solution 100 mL (100 mLs Intravenous Given 8/3/21 0906)       NEW PRESCRIPTIONS  STARTED AT TODAY'S ED VISIT:  New Prescriptions    No medications on file       HPI     Patient information obtained from: Patient    Use of Interpretor: N/A     Sadasusan Pederosn is a 60 year old female with a pertinent history of alcohol abuse, HTN, and anxiety who presents to this ED via walk in for evaluation of abdominal pain, hematemesis, and black stools.    Patient reports 2 days go she went out to breakfast, developed abdominal bloating throughout the day with associated pain, then had an episode of stringy, coffee ground appearing emesis that evening. Patient also notes she has had hard, black, tarry stools since starting an iron supplement 3 months ago. She has not taken her iron for the past few days and is still having black stools. At present patient endorses abdominal pain and feeling restless and tired. She also reports she has shallow breathing, but denies chest pain.     Patient notes a history of alcohol abuse, last drinking yesterday. She reports that she applied for a treatment program in the past and was accepted, but was unable to go as she accepted a job that conflicted with the time of the program. She states she has been doing better, only drinking a beer or two, but has noted a couple of slip ups with alcohol abuse. Patient denies any recent drug use.     She is on fluoxitine 40 mg, a multivitamin, an allergy pill, and an iron supplement.       REVIEW OF SYSTEMS:  Review of Systems   Constitutional: Negative for fever, malaise  HEENT: Negative runny nose, sore throat, ear pain, neck pain  Respiratory: Negative for shortness of breath, cough, congestion  Cardiovascular: Negative for chest pain, leg edema  Gastrointestinal: Positive for abdominal distention, abdominal pain, vomiting (hematemesis, stringy coffee ground appearing). Positive for black tarry stools. Negative for constipation, diarrhea  Genitourinary: Negative for dysuria and hematuria.   Integument: Negative for rash, skin  "breakdown  Neurological: Negative for paresthesias, weakness, headache.  Musculoskeletal: Negative for joint pain, joint swelling      All other systems reviewed and are negative.          MEDICAL HISTORY     No past medical history on file.    Past Surgical History:   Procedure Laterality Date     CHOLECYSTECTOMY       SHOULDER ARTHROSCOPY W/ ACROMIAL REPAIR Right        Social History     Tobacco Use     Smoking status: Current Every Day Smoker     Packs/day: 0.50     Years: 40.00     Pack years: 20.00     Types: Cigarettes, Cigarettes     Smokeless tobacco: Never Used   Substance Use Topics     Alcohol use: Yes     Alcohol/week: 14.0 standard drinks     Drug use: Yes     Types: Cocaine       aspirin-acetaminophen-caffeine (EXCEDRIN MIGRAINE) 250-250-65 mg per tablet  diphenhydrAMINE (BENADRYL) 25 MG tablet  ferrous sulfate (FEROSUL) 325 (65 Fe) MG tablet  FLUoxetine (PROZAC) 40 MG capsule  MULTIVIT WITH MINERALS/LUTEIN (MULTIVITAMIN 50 PLUS ORAL)            PHYSICAL EXAM     /60   Pulse 57   Temp 99  F (37.2  C) (Oral)   Resp 18   Ht 1.6 m (5' 3\")   Wt 62 kg (136 lb 9.6 oz)   SpO2 99%   Breastfeeding No   BMI 24.20 kg/m        PHYSICAL EXAM:     General: Patient appears well, nontoxic, comfortable  HEENT: Moist mucous membranes, no tongue swelling.  No head trauma.  No midline neck pain.  Cardiovascular: Normal rate, normal rhythm, no extremity edema.  No appreciable murmur.  Respiratory: No signs of respiratory distress, lungs are clear to auscultation bilaterally with no wheezes rhonchi or rales.  Abdominal: Soft,, nondistended, no palpable masses, no guarding, no rebound.  Brown stool.  Mild abdominal pain.  Musculoskeletal: Full range of motion of joints, no deformities appreciated.  Neurological: Alert and oriented, grossly neurologically intact.  Psychological: Normal affect and mood.  Integument: No rashes appreciated          RESULTS       Labs Ordered and Resulted from Time of ED Arrival Up " to the Time of Departure from the ED   CBC WITH PLATELETS AND DIFFERENTIAL - Abnormal; Notable for the following components:       Result Value    RBC Count 3.78 (*)     MCH 33.1 (*)     All other components within normal limits   OCCULT BLOOD STOOL POCT - Abnormal; Notable for the following components:    Occult Blood POCT Positive (*)     All other components within normal limits   COMPREHENSIVE METABOLIC PANEL - Normal   PARTIAL THROMBOPLASTIN TIME - Normal   INR - Normal   LIPASE - Normal   ETHYL ALCOHOL LEVEL - Normal   SARS-COV2 (COVID-19) VIRUS RT-PCR - Normal    Narrative:     Testing was performed using the armond  SARS-CoV-2 & Influenza A/B Assay on the arm[employer]  [employer]eline  System.  This test should be ordered for the detection of SARS-COV-2 in individuals who meet SARS-CoV-2 clinical and/or epidemiological criteria. Test performance is unknown in asymptomatic patients.  This test is for in vitro diagnostic use under the FDA EUA for laboratories certified under CLIA to perform moderate and/or high complexity testing. This test has not been FDA cleared or approved.  A negative test does not rule out the presence of PCR inhibitors in the specimen or target RNA in concentration below the limit of detection for the assay. The possibility of a false negative should be considered if the patient's recent exposure or clinical presentation suggests COVID-19.  [facility] are certified under the Clinical Laboratory Improvement Amendments of 1988 (CLIA-88) as qualified to perform moderate and/or high complexity laboratory testing.   PERIPHERAL IV CATHETER   CALL   CALL   TYPE AND SCREEN, ADULT   COVID-19 VIRUS (CORONAVIRUS) BY PCR    Narrative:     The following orders were created for panel order Asymptomatic COVID-19 Virus (Coronavirus) by PCR Nasopharyngeal.  Procedure                               Abnormality         Status                     ---------                               -----------          ------                     SARS-COV2 (COVID-19) Vir...[[id number]]  Normal              Final result                 Please view results for these tests on the individual orders.   CBC WITH PLATELETS & DIFFERENTIAL    Narrative:     The following orders were created for panel order CBC with platelets differential.  Procedure                               Abnormality         Status                     ---------                               -----------         ------                     CBC with platelets and d...[[id number]]  Abnormal            Final result                 Please view results for these tests on the individual orders.   ABO/RH TYPE AND SCREEN    Narrative:     The following orders were created for panel order ABO/Rh type and screen.  Procedure                               Abnormality         Status                     ---------                               -----------         ------                     Adult Type and Screen[[id number]]                            Edited Result - FINAL        Please view results for these tests on the individual orders.       CTA Abdomen Pelvis with Contrast   Final Result   IMPRESSION:   1.  Mildly thickened loops of proximal jejunum most suggestive of nonspecific enteritis. Nothing for active GI bleeding. Bowel loops otherwise normal.      2.  Indeterminant 2.5 cm liver lesion segment 3 features most suggestive of a benign focal nodular hyperplasia. MRI the liver using Eovist contrast enhancement would be more definitive.      3.  No other significant findings.      NOTE: ABNORMAL REPORT      THE DICTATION ABOVE DESCRIBES AN ABNORMALITY FOR WHICH FOLLOW-UP IS NEEDED.                         PROCEDURES:  Procedures:  Procedures       I, Kristie Boogie am serving as a scribe to document services personally performed by Devante Stanton DO, based on my observations and the provider's statements to me.  I, Devante Stanton DO, attest that Kristie Boogie is acting in a scribe capacity, has  observed my performance of the services and has documented them in accordance with my direction.    Devante Stanton,   Emergency Medicine  [facility] EMERGENCY ROOM       Ohl, Devante Martínez DO  08/03/21 8710

## 2021-08-03 NOTE — PROGRESS NOTES
Txt paged Dr. Navarro 5:31 PM 08/03/21:     DESTINY Mayo #[id number]  3114: GILBERTO  Remains NPO, can we transition diet? Thx!      -----------------------------  Pending response     **ADDENDUM 5:38 PM 08/03/21 - Dr. Navarro placed order for Clear Liquid Diet (Advance diet as tolerated)

## 2021-08-03 NOTE — CONSULTS
Care Management Initial Consult    General Information  Assessment completed with: Patient,    Type of CM/SW Visit: Initial Assessment    Primary Care Provider verified and updated as needed: Yes   Readmission within the last 30 days:        Reason for Consult: discharge planning  Advance Care Planning:            Communication Assessment  Patient's communication style: spoken language (English or Bilingual)    Hearing Difficulty or Deaf: no   Wear Glasses or Blind: yes    Cognitive  Cognitive/Neuro/Behavioral: WDL                      Living Environment:   People in home: spouse     Current living Arrangements: house      Able to return to prior arrangements: yes       Family/Social Support:  Care provided by: self  Provides care for:    Marital Status:     Alex       Description of Support System:      Support Assessment: Adequate family and caregiver support    Current Resources:   Patient receiving home care services: No     Community Resources: None  Equipment currently used at home: none  Supplies currently used at home: None    Employment/Financial:  Employment Status: employed full-time        Financial Concerns: No concerns identified           Lifestyle & Psychosocial Needs:  Social Determinants of Health     Tobacco Use: High Risk     Smoking Tobacco Use: Current Every Day Smoker     Smokeless Tobacco Use: Never Used   Alcohol Use:      Frequency of Alcohol Consumption:      Average Number of Drinks:      Frequency of Binge Drinking:    Financial Resource Strain:      Difficulty of Paying Living Expenses:    Food Insecurity:      Worried About Running Out of Food in the Last Year:      Ran Out of Food in the Last Year:    Transportation Needs:      Lack of Transportation (Medical):      Lack of Transportation (Non-Medical):    Physical Activity:      Days of Exercise per Week:      Minutes of Exercise per Session:    Stress:      Feeling of Stress :    Social Connections:      Frequency of  Communication with Friends and Family:      Frequency of Social Gatherings with Friends and Family:      Attends Quaker Services:      Active Member of Clubs or Organizations:      Attends Club or Organization Meetings:      Marital Status:    Intimate Partner Violence:      Fear of Current or Ex-Partner:      Emotionally Abused:      Physically Abused:      Sexually Abused:    Depression:      PHQ-2 Score:    Housing Stability:      Unable to Pay for Housing in the Last Year:      Number of Places Lived in the Last Year:      Unstable Housing in the Last Year:        Functional Status:  Prior to admission patient needed assistance:   Dependent ADLs:: Independent  Dependent IADLs:: Independent  Assesssment of Functional Status: At functional baseline    Mental Health Status:          Chemical Dependency Status:  Chemical Dependency Status: Past Concern  Chemical Dependency Management: Other (see comment) (Pt had Rule 25. Will be starting outpt therapy soon)          Values/Beliefs:  Spiritual, Cultural Beliefs, Quaker Practices, Values that affect care:                 Additional Information:  AIDET completed.  Pt lives with spouse Alex in a private residence. She is independent with all ADL's, has no services and no DME used. Pt states she had a Rule 25 done and will be getting set up with outpt Chem dep therapy soon. Offered SWCM to discuss options, pt feels she has a good plan currently. Anticipate pt will DC back home without needs.  Family will transport upon DC. Informed that CM will follow progression of care.   GRICEL Caldwell      Abbreviation Code:  [facility] Home care (HC), Patient (Pt), Transitional Care Unit (TCU), Skilled Nursing Facility (SNF), Assisted Living (AL), Independent Living (IL), Physical Therapy (PT), Occupational Therapy (OT)        Vidya Farias RN

## 2021-08-03 NOTE — H&P
[facility] MEDICINE ADMISSION HISTORY AND PHYSICAL     Assessment & Plan       Sada Pederson is a 60 year old old female with history of chronic arthralgia, daily drink of vodka, otherwise medically stable presented with nausea vomiting coffee-ground emesis black tarry stools and abdominal pain    Upper GI bleed likely NSAID and alcohol related  -Patient has been taking Naprosyn daily for 1 month  -Patient also has history of regular intake of vodka  -Patient was given IV Protonix twice daily  -Patient will be made n.p.o. and given IV fluids, advance diet as tolerated.  -Gastroenterology consult for possible EGD, either done inpatient or outpatient.  -Monitor hemoglobin globin and hemodynamics  -Stools are tarry black and she does take iron but it is occult blood positive.    History of alcohol abuse  -Advised about cessation of drinking vodka  -Patient will be given outpatient resources for this  -Patient has quit drinking for about a week    Nonspecific jejunal enteritis seen on CAT scan  -Observe symptoms.    Indeterminant 2.5 cm liver lesion, benign focal nodular hyperplasia  -Have this reevaluated as an outpatient with primary care doctor or if symptomatic with gastroenterology.  -Nothing to do urgently at this time.  -LFTs are within normal limits.    Mood disorder  -Fluoxetine reordered.      DVTP: Low Risk Patient , Avoid anticoagulation in light of the GI bleed  Code Status: No Order  Disposition: Observation With telemetry  Expected LOS: 1 day  Goals for the hospitalization: Monitor hemoglobin, prevent GI bleed, GI consult, hemodynamic stability    Chief Complaint  black tarry stools and abdominal pain     HISTORY     Sada Pederson is a 60 year old old female with h/o chronic pain for which she takes Naprosyn regularly, she also has history of drinking vodka daily.  She did quit drinking vodka about a week ago.  P/w abdominal pain and coffee-ground emesis and black tarry  stools.    Patient has had no prior history of upper GI bleed.  Patient does admit that she has been taking naproxen daily for about a month.  She uses this for myalgias and arthralgias.  She otherwise has no chronic medical issue.  Additionally she does drink vodka daily but never been hospitalized for withdrawals in the past.  She says she has quit drinking and is now gainfully employed.  2 days ago however she started having nausea with coffee-ground emesis as well as abdominal epigastric discomfort and note of black tarry stools.  She does take iron supplements however.  She takes the iron not because she is anemic but because she used to donate plasma.  She has had no appetite for the past couple of days.    Her daughter who is an EMT personnel advised her to go to the ER.  Stools are occult blood positive.  In the ER her hemoglobin is 12.5.  She did complain of some shortness of breath and chest pain but denied any heartburn symptoms.  CAT scan of the abdomen and chest showed some enteritis in the jejunum area.  Patient was given IV Protonix.  GI was consulted from the ER and notified about this admission.  Patient denies any weight loss.  No history of stomach or gastrointestinal cancer in the family.  She wants to be a full code.  She lives with her .    Past Medical History     No past medical history on file.     Surgical History     Past Surgical History:   Procedure Laterality Date     CHOLECYSTECTOMY       SHOULDER ARTHROSCOPY W/ ACROMIAL REPAIR Right      Family History    Reviewed, and   Family History   Problem Relation Age of Onset     Dementia Mother 78.00     Depression Son      Diabetes No family hx of      Heart Disease No family hx of      Cerebrovascular Disease No family hx of       Social History      Social History     Tobacco Use     Smoking status: Current Every Day Smoker     Packs/day: 0.50     Years: 40.00     Pack years: 20.00     Types: Cigarettes, Cigarettes     Smokeless  tobacco: Never Used   Substance Use Topics     Alcohol use: Yes     Alcohol/week: 14.0 standard drinks     Drug use: Yes     Types: Cocaine      Allergies     Allergies   Allergen Reactions     Codeine Nausea and Vomiting     Prior to Admission Medications      Prior to Admission Medications   Prescriptions Last Dose Informant Patient Reported? Taking?   FLUoxetine (PROZAC) 40 MG capsule 8/2/2021 at Unknown time  Yes Yes   Sig: Take 40 mg by mouth every evening   MULTIVIT WITH MINERALS/LUTEIN (MULTIVITAMIN 50 PLUS ORAL) 8/2/2021 at Unknown time  Yes Yes   Sig: [MULTIVIT WITH MINERALS/LUTEIN (MULTIVITAMIN 50 PLUS ORAL)] Take 1 tablet by mouth daily.   aspirin-acetaminophen-caffeine (EXCEDRIN MIGRAINE) 250-250-65 mg per tablet 8/2/2021 at Unknown time  Yes Yes   Sig: Take 1 tablet by mouth daily as needed for headaches    diphenhydrAMINE (BENADRYL) 25 MG tablet 8/2/2021 at Unknown time  Yes Yes   Sig: Take 25 mg by mouth daily For allergies   ferrous sulfate (FEROSUL) 325 (65 Fe) MG tablet 8/2/2021 at Unknown time  Yes Yes   Sig: Take 325 mg by mouth daily (with breakfast)      Facility-Administered Medications: None      Review of Systems     A 12 point comprehensive review of systems was negative except as noted above in HPI.    PHYSICAL EXAMINATION       Vitals      Temp:  [99  F (37.2  C)] 99  F (37.2  C)  Pulse:  [54-70] 54  Resp:  [18-20] 18  BP: (128-188)/(60-86) 128/60  SpO2:  [97 %-99 %] 98 %    Examination     GENERAL:  Alert, appears comfortable, in no acute distress, appears stated age   HEAD:  Normocephalic, without obvious abnormality, atraumatic   EYES:  PERRL, conjunctiva/corneas clear, no scleral icterus, EOM's intact   NOSE: Nares normal, septum midline, mucosa normal, no drainage   THROAT: Lips, mucosa, and tongue normal; teeth and gums normal, mouth moist   NECK: Supple, symmetrical, trachea midline   BACK:   Symmetric, no curvature, ROM normal   LUNGS:   Clear to auscultation bilaterally, no  rales, rhonchi, or wheezing, symmetric chest rise on inhalation, respirations unlabored   CHEST WALL:  No tenderness or deformity   HEART:  Regular rate and rhythm, S1 and S2 normal, no murmur, rub, or gallop    ABDOMEN:    Soft, slightly tender on palpation of the epigastric area with no guarding, bowel sounds active all four quadrants, no masses, no organomegaly, no rebound or guarding   EXTREMITIES: Extremities normal, atraumatic, no cyanosis or edema    SKIN: Dry to touch, no exanthems in the visualized areas   NEURO: Alert, oriented x 4, moves all four extremities freely, non-focal   PSYCH: Cooperative, behavior is appropriate      Pertinent Lab     Most Recent 3 CBC's:Recent Labs   Lab Test 08/03/21  0743 01/17/18  2304   WBC 5.8 10.4   HGB 12.5 13.5   MCV 98 95    285     Most Recent 3 BMP's:Recent Labs   Lab Test 08/03/21  0743 05/24/18  0752 01/17/18  2304     --  145   POTASSIUM 4.1  --  4.0   CHLORIDE 107  --  107   CO2 22  --  25   BUN 12  --  13   CR 0.74 0.8 0.77   ANIONGAP 12  --  13   CIARA 9.2  --  9.2   GLC 95  --  99     Most Recent 2 LFT's:Recent Labs   Lab Test 08/03/21  0743 01/17/18  2304   AST 18 16   ALT 18 18   ALKPHOS 87 88   BILITOTAL 0.4 0.1     Most Recent 3 INR's:Recent Labs   Lab Test 08/03/21  0743 01/17/18  2343   INR 0.92 0.92     Most Recent 3 Troponin's:No lab results found.      Pertinent Radiology     Radiology Results:   Recent Results (from the past 24 hour(s))   CTA Abdomen Pelvis with Contrast    Narrative    EXAM: CTA ABDOMEN PELVIS WITH CONTRAST  LOCATION: [facility]  DATE/TIME: 8/3/2021 9:00 AM    INDICATION: Upper GI bleed. Coffee-ground emesis. Black tarry stools.  COMPARISON: None.  TECHNIQUE: CT angiogram abdomen pelvis during arterial phase of injection of IV contrast. 2D and 3D MIP reconstructions were performed by the CT technologist. Dose reduction techniques were used.  CONTRAST: Isovue-370 100mL     FINDINGS:  ANGIOGRAM  ABDOMEN/PELVIS: Mild atherosclerotic plaque throughout the aorta. No aneurysms. No stenoses in the aorta or iliac arteries. Widely patent great vessels off the abdominal aorta with separate origin of the hepatic artery which is a normal   variation. Widely patent renal arteries.    LOWER CHEST: Normal.    HEPATOBILIARY: Hyperenhancing 2.5 cm lesion segment 3 of the liver blends in with the liver on the portal venous phase. Characteristics typical of focal nodular hyperplasia.    PANCREAS: Normal.    SPLEEN: Normal.    ADRENAL GLANDS: Normal.    KIDNEYS/BLADDER: Normal.    BOWEL: There is a few loops of jejunum with slightly thickened folds most suggestive of some mild enteritis. Nothing for active GI bleeding. The remainder of the bowel loops are normal.    LYMPH NODES: Normal.    PELVIC ORGANS: Normal.    MUSCULOSKELETAL: Normal.      Impression    IMPRESSION:  1.  Mildly thickened loops of proximal jejunum most suggestive of nonspecific enteritis. Nothing for active GI bleeding. Bowel loops otherwise normal.    2.  Indeterminant 2.5 cm liver lesion segment 3 features most suggestive of a benign focal nodular hyperplasia. MRI the liver using Eovist contrast enhancement would be more definitive.    3.  No other significant findings.    NOTE: ABNORMAL REPORT    THE DICTATION ABOVE DESCRIBES AN ABNORMALITY FOR WHICH FOLLOW-UP IS NEEDED.      EKG Results: --    Advance Care Planning        Seven Navarro MD  [facility]   Phone: #[phone]

## 2021-08-03 NOTE — PROGRESS NOTES
Txt paged Dr. Navarro 4:32 PM 08/03/21 regarding Gastroenterology consult and plans for outpatient EGD and to take PPI at home - Asked: if ok to discharge home today (8/3)?     ADDENDUM 4:37 PM 08/03/21 - Per Dr. Navarro, patient to stay (8/3) to monitor hgb level.

## 2021-08-04 VITALS
BODY MASS INDEX: 22.96 KG/M2 | TEMPERATURE: 97.9 F | DIASTOLIC BLOOD PRESSURE: 72 MMHG | RESPIRATION RATE: 16 BRPM | WEIGHT: 134.5 LBS | HEART RATE: 57 BPM | OXYGEN SATURATION: 95 % | SYSTOLIC BLOOD PRESSURE: 166 MMHG | HEIGHT: 64 IN

## 2021-08-04 LAB
ALBUMIN SERPL-MCNC: 3.2 G/DL (ref 3.5–5)
ALP SERPL-CCNC: 69 U/L (ref 45–120)
ALT SERPL W P-5'-P-CCNC: 14 U/L (ref 0–45)
ANION GAP SERPL CALCULATED.3IONS-SCNC: 2 MMOL/L (ref 5–18)
AST SERPL W P-5'-P-CCNC: 14 U/L (ref 0–40)
BILIRUB SERPL-MCNC: 0.3 MG/DL (ref 0–1)
BUN SERPL-MCNC: 8 MG/DL (ref 8–22)
CALCIUM SERPL-MCNC: 8.6 MG/DL (ref 8.5–10.5)
CHLORIDE BLD-SCNC: 113 MMOL/L (ref 98–107)
CO2 SERPL-SCNC: 26 MMOL/L (ref 22–31)
CREAT SERPL-MCNC: 0.7 MG/DL (ref 0.6–1.1)
ERYTHROCYTE [DISTWIDTH] IN BLOOD BY AUTOMATED COUNT: 12.3 % (ref 10–15)
GFR SERPL CREATININE-BSD FRML MDRD: >90 ML/MIN/1.73M2
GLUCOSE BLD-MCNC: 106 MG/DL (ref 70–125)
HCT VFR BLD AUTO: 36.1 % (ref 35–47)
HGB BLD-MCNC: 12.1 G/DL (ref 11.7–15.7)
MCH RBC QN AUTO: 33.6 PG (ref 26.5–33)
MCHC RBC AUTO-ENTMCNC: 33.5 G/DL (ref 31.5–36.5)
MCV RBC AUTO: 100 FL (ref 78–100)
PLATELET # BLD AUTO: 158 10E3/UL (ref 150–450)
POTASSIUM BLD-SCNC: 4.3 MMOL/L (ref 3.5–5)
PROT SERPL-MCNC: 5.9 G/DL (ref 6–8)
RBC # BLD AUTO: 3.6 10E6/UL (ref 3.8–5.2)
SODIUM SERPL-SCNC: 141 MMOL/L (ref 136–145)
WBC # BLD AUTO: 4.6 10E3/UL (ref 4–11)

## 2021-08-04 PROCEDURE — 250N000011 HC RX IP 250 OP 636: Performed by: INTERNAL MEDICINE

## 2021-08-04 PROCEDURE — 96361 HYDRATE IV INFUSION ADD-ON: CPT

## 2021-08-04 PROCEDURE — G0378 HOSPITAL OBSERVATION PER HR: HCPCS

## 2021-08-04 PROCEDURE — 85027 COMPLETE CBC AUTOMATED: CPT | Performed by: INTERNAL MEDICINE

## 2021-08-04 PROCEDURE — 99217 PR OBSERVATION CARE DISCHARGE: CPT | Performed by: INTERNAL MEDICINE

## 2021-08-04 PROCEDURE — 258N000003 HC RX IP 258 OP 636: Performed by: INTERNAL MEDICINE

## 2021-08-04 PROCEDURE — 96376 TX/PRO/DX INJ SAME DRUG ADON: CPT

## 2021-08-04 PROCEDURE — C9113 INJ PANTOPRAZOLE SODIUM, VIA: HCPCS | Performed by: INTERNAL MEDICINE

## 2021-08-04 PROCEDURE — 82040 ASSAY OF SERUM ALBUMIN: CPT | Performed by: INTERNAL MEDICINE

## 2021-08-04 PROCEDURE — 36415 COLL VENOUS BLD VENIPUNCTURE: CPT | Performed by: INTERNAL MEDICINE

## 2021-08-04 RX ADMIN — PANTOPRAZOLE SODIUM 40 MG: 40 INJECTION, POWDER, FOR SOLUTION INTRAVENOUS at 07:53

## 2021-08-04 RX ADMIN — SODIUM CHLORIDE: 9 INJECTION, SOLUTION INTRAVENOUS at 07:55

## 2021-08-04 NOTE — PLAN OF CARE
Problem: Adjustment to Illness (Gastrointestinal Bleeding)  Goal: Optimal Coping with Acute Illness  8/3/2021 2146 by Sadie Dao, RN  Outcome: Improving     Evening Hgb level at 12.4. 1 BM during the evening, pt unable to report if any blood was present in stool. Abdominal tenderness present and bowel sounds active. Denies pain, IV fluids continued for hydration. Independent in room. VSS.

## 2021-08-04 NOTE — PLAN OF CARE
PRIMARY DIAGNOSIS: GI BLEED    OUTPATIENT/OBSERVATION GOALS TO BE MET BEFORE DISCHARGE  1. Orthostatic performed: No    2. Stable Hgb Yes.   Recent Labs   Lab Test 08/03/21  2343 08/03/21  1803 08/03/21  1500   HGB 11.4* 12.4 12.2       3. Resolved or declined bleeding episodes: Yes Last episode: No bleeding noticed per pt.    4. Appropriate testing complete: Yes    5. Cleared for discharge by consultants (if involved): Yes    6. Safe discharge environment identified: Yes    Discharge Planner Nurse   Safe discharge environment identified: Yes  Barriers to discharge: Yes Awaiting hgb recheck on AM lab draws.       Entered by: Josiah Brian 08/04/2021 1:20 AM     Please review provider order for any additional goals.   Nurse to notify provider when observation goals have been met and patient is ready for discharge.

## 2021-08-04 NOTE — PROGRESS NOTES
PRIMARY DIAGNOSIS: GI BLEED    OUTPATIENT/OBSERVATION GOALS TO BE MET BEFORE DISCHARGE  Orthostatic performed: Yes:                        1.             2. Stable Hgb Yes.   Recent Labs   Lab Test 08/03/21  1803 08/03/21  1500 08/03/21  0743   HGB 12.4 12.2 12.5       3. Resolved or declined bleeding episodes: Yes Last episode: (8/2) PM bowel movement per patient     4. Appropriate testing complete: Yes    5. Cleared for discharge by consultants (if involved): Yes    6. Safe discharge environment identified: Yes    Discharge Planner Nurse   Safe discharge environment identified: Yes  Barriers to discharge: Yes - pending stable hemoglobin overnight (8/3)-(8/4)        Entered by: Love Duran 08/03/2021 7:03 PM     Please review provider order for any additional goals.   Nurse to notify provider when observation goals have been met and patient is ready for discharge.

## 2021-08-04 NOTE — PLAN OF CARE
PRIMARY DIAGNOSIS: GI BLEED    OUTPATIENT/OBSERVATION GOALS TO BE MET BEFORE DISCHARGE  Orthostatic performed: No    Stable Hgb Yes.   Recent Labs   Lab Test 08/03/21  2343 08/03/21  1803 08/03/21  1500   HGB 11.4* 12.4 12.2       Resolved or declined bleeding episodes: Yes Last episode: No bleeding noted or reported by pt.     Appropriate testing complete: Yes    Cleared for discharge by consultants (if involved): Yes    Safe discharge environment identified: Yes    Discharge Planner Nurse   Safe discharge environment identified: Yes  Barriers to discharge: Yes. Awaiting hgb recheck with AM lab draws.       Entered by: Josiah Brian 08/04/2021 4:09 AM     Please review provider order for any additional goals.   Nurse to notify provider when observation goals have been met and patient is ready for discharge.    Pt is vitally stable at this time and has not reported any bleeding or blood on her stools during this shift. Denies pain at this time and calls appropriately. Awaiting hgb recheck this morning and possible discharge today with stable hgb values. Will continue to monitor.

## 2021-08-04 NOTE — DISCHARGE SUMMARY
[facility] MEDICINE  DISCHARGE SUMMARY     Primary Care Physician: Jose Ochoa  Admission Date: 8/3/2021   Discharge Provider: Seven Navarro MD Discharge Date: 8/4/2021   Diet:   Active Diet and Nourishment Order   Procedures     Regular Diet Adult     Diet       Code Status: Full Code   Activity: DCACTIVITY: Activity as tolerated        Condition at Discharge: Stable     REASON FOR PRESENTATION(See Admission Note for Details)   Black tarry stools and epigastric discomfort    PRINCIPAL & ACTIVE DISCHARGE DIAGNOSES     Active Problems:    Upper GI bleed      PENDING LABS     Unresulted Labs Ordered in the Past 30 Days of this Admission     No orders found for last 31 day(s).            PROCEDURES ( this hospitalization only)          RECOMMENDATIONS TO OUTPATIENT PROVIDER FOR F/U VISIT     Follow-up Appointments     Follow-up and recommended labs and tests       Follow up with primary care provider, Jose Ochoa, within 7   days to evaluate medication change, to evaluate treatment change, and to   follow up on results.  The following labs/tests are recommended: CBC to   follow hemoglobin.    Follow up with Gastroenterology as instructed for outpatient EGD  Stop taking naprosyn (Aleve), anything with aspirin or ibuprofen  Have your blood pressure rechecked and treated  Follow up on your liver focal hyperplasia. This is most likely benign but   needs follow up over time to see if its growing.    Per gastroenterology:  No signs of ongoing GI bleeding with light tan stool, normal hemoglobin   and normal vital signs.  She can return home and we will plan on EGD later   this week as an outpatient.  Needs to stop the NSAIDs and alcohol.  Plan   on PPI for the next month or 2 and then stop.  Should undergo gastric and   esophageal biopsies with upcoming EGD.   Is reporting some solid food   dysphagia and should have esophageal biopsies.            Notification: EGD scheduled for outpatient later in the day on 8/4/2021.      DISPOSITION     Home with instructions for outpatient EGD    SUMMARY OF HOSPITAL COURSE:      Sada Pederson is a 60 year old old female with history of chronic arthralgia, daily drink of vodka, otherwise medically stable presented with nausea vomiting coffee-ground emesis black tarry stools and abdominal pain.  Patient has history of chronic pain and she takes Naprosyn regularly.  She has also been drinking vodka daily in the past but has been trying to be sober.  She otherwise has no chronic medical issues.  2 days prior to this admission she started having nausea with coffee-ground emesis as well as abdominal epigastric discomfort and note of black tarry stools.  She does take iron supplements.  She takes iron but not because she is anemic with because she used to donate plasma.  She has had no appetite for the past couple of days.  She then went to the emergency room upon the advice of her daughter and her stools were positive for occult blood.  In the ER her initial hemoglobin is 12.5.  CAT scan of the abdomen and chest showed some enteritis in the jejunal area but denies any diarrhea.  Patient was given IV Protonix.  GI was consulted.  There is no history of stomach or gastrointestinal cancer in the family.  Patient is full code.  She lives with her .     Upper GI bleed likely NSAID and alcohol related stable hemoglobin  -Patient has been taking Naprosyn daily for 1 month and advised to stop  -Patient also has history of regular intake of vodka and advised to stop  -Patient was given IV Protonix twice daily--> discharge with prilosec twice daily  -Patient will be made n.p.o. and given IV fluids, advance diet as tolerated --> advanced and then tolerated. advsied to stop spicy foods and carbonated drinks and no alcohol.  -Gastroenterology consult for possible EGD, either done inpatient or outpatient --> no active signs of  bleeding so will do EGD as an outpatient.  The outpatient EGD was scheduled a little later today.  -Monitor hemoglobin and hemodynamics --> hgb stable, no significant drop nad blood pressure stable overall  -Stools are tarry black and she does take iron but it is occult blood positive --> no recurrence and no signs of active agreessive bleeding ; GI says this can be pursued with outpatient EGD and esophageal biopsies, too.  -She was given specific instructions by gastroenterology regarding the outpatient EGD today.     History of alcohol abuse  -Advised about cessation of drinking vodka  -Patient will be given outpatient resources for this  -Patient has quit drinking for about a week  -no withdrawal noted during her stay in the hospital     Nonspecific jejunal enteritis seen on CAT scan, clinically stable  -Observe symptoms. No diarrhea.   -monitor for worsening symptoms; otherwise can be discharged  -She was given IV fluids and advance diet.     Indeterminant 2.5 cm liver lesion, benign focal nodular hyperplasia  -Have this reevaluated as an outpatient with primary care doctor or if symptomatic with gastroenterology.  -Nothing to do urgently at this time.  -LFTs are within normal limits.  -follow up with primary doc and monitor for nay change     Mood disorder, stable  -Fluoxetine reordered.    At a time for discharge she was doing quite well.  She understands that she will have EGD later today on the same day of discharge.  Will be done as an outpatient in the clinic.  Denies any chest pain or shortness of breath.  No nausea or vomiting.  No recurrence of melena or hematochezia.  No hematemesis.  She was hemodynamically stable.  She was discharged in stable condition.    Discharge Medications with Med changes:     Current Discharge Medication List      START taking these medications    Details   omeprazole (PRILOSEC) 20 MG DR capsule Take 1 capsule (20 mg) by mouth 2 times daily  Qty: 60 capsule, Refills: 0     Associated Diagnoses: Upper GI bleed         CONTINUE these medications which have NOT CHANGED    Details   diphenhydrAMINE (BENADRYL) 25 MG tablet Take 25 mg by mouth daily For allergies      ferrous sulfate (FEROSUL) 325 (65 Fe) MG tablet Take 325 mg by mouth daily (with breakfast)      FLUoxetine (PROZAC) 40 MG capsule Take 40 mg by mouth every evening      MULTIVIT WITH MINERALS/LUTEIN (MULTIVITAMIN 50 PLUS ORAL) [MULTIVIT WITH MINERALS/LUTEIN (MULTIVITAMIN 50 PLUS ORAL)] Take 1 tablet by mouth daily.         STOP taking these medications       aspirin-acetaminophen-caffeine (EXCEDRIN MIGRAINE) 250-250-65 mg per tablet Comments:   Reason for Stopping:                     Rationale for medication changes:      Omeprazole twice daily for presumed upper GI bleed.  Stop aspirin.  Stop Naprosyn.  Stop any NSAIDs.        Consults       SOCIAL WORK IP CONSULT  GASTROENTEROLOGY IP CONSULT    Immunizations given this encounter       There is no immunization history on file for this patient.        Anticoagulation Information      Recent INR results:   Recent Labs   Lab 08/03/21  0743   INR 0.92     Warfarin doses (if applicable) or name of other anticoagulant: Not on warfarin      SIGNIFICANT IMAGING FINDINGS     Results for orders placed or performed during the hospital encounter of 08/03/21   CTA Abdomen Pelvis with Contrast    Impression    IMPRESSION:  1.  Mildly thickened loops of proximal jejunum most suggestive of nonspecific enteritis. Nothing for active GI bleeding. Bowel loops otherwise normal.    2.  Indeterminant 2.5 cm liver lesion segment 3 features most suggestive of a benign focal nodular hyperplasia. MRI the liver using Eovist contrast enhancement would be more definitive.    3.  No other significant findings.    NOTE: ABNORMAL REPORT    THE DICTATION ABOVE DESCRIBES AN ABNORMALITY FOR WHICH FOLLOW-UP IS NEEDED.        SIGNIFICANT LABORATORY FINDINGS       Metabolic panel is unremarkable.  Normal sodium  potassium CO2, creatinine and BUN and calcium.  Albumin slightly low at 3.2.  LFTs and lipase were normal.    Stool occult blood was positive.  Random blood sugar was in the range of   CBC shows white count normal.  Serial hemoglobin showed the following 12.5--> 12.2--> 11.4--> 12.1  Platelets within normal limits  INR 0.92.  PTT 25  COVID-19 negative  Blood alcohol level was less than 10    Discharge Orders        Reason for your hospital stay    GI bleed probably stomach ulcer     Activity    Your activity upon discharge: activity as tolerated     Follow-up and recommended labs and tests     Follow up with primary care provider, Jose Ochoa, within 7 days to evaluate medication change, to evaluate treatment change, and to follow up on results.  The following labs/tests are recommended: CBC to follow hemoglobin.    Follow up with Gastroenterology as instructed for outpatient EGD  Stop taking naprosyn (Aleve), anything with aspirin or ibuprofen  Have your blood pressure rechecked and treated  Follow up on your liver focal hyperplasia. This is most likely benign but needs follow up over time to see if its growing.    Per gastroenterology:  No signs of ongoing GI bleeding with light tan stool, normal hemoglobin and normal vital signs.  She can return home and we will plan on EGD later this week as an outpatient.  Needs to stop the NSAIDs and alcohol.  Plan on PPI for the next month or 2 and then stop.  Should undergo gastric and esophageal biopsies with upcoming EGD.   Is reporting some solid food dysphagia and should have esophageal biopsies.     Diet    Follow this diet upon discharge: regular diet; avoid spicy food and carbonated drinks       Examination   Physical Exam   Temp:  [97.3  F (36.3  C)-98.1  F (36.7  C)] 97.9  F (36.6  C)  Pulse:  [54-80] 57  Resp:  [16-20] 16  BP: (128-189)/(59-97) 166/72  SpO2:  [95 %-99 %] 95 %  Wt Readings from Last 1 Encounters:   08/03/21 61 kg (134 lb 8 oz)    Alert and current not in distress.  Able to stand and ambulate.  No JVD or jaundice.  Lungs are clear no wheezing or crackles.  Regular heart rhythm with no significant murmur.  Abdomen soft no significant peritoneal signs.  No guarding.  No palpable mass.  Slight epigastric discomfort on palpation.  No peripheral edema tenderness of calves no cyanosis.  No signs of acute stroke.      Please see EMR for more detailed significant labs, imaging, consultant notes etc.    I, Seven Navarro MD, personally saw the patient today and spent greater than 30 minutes discharging this patient.    Seven Navarro MD  [facility]    CC:Jose Ochoa

## 2021-08-04 NOTE — PLAN OF CARE
PRIMARY DIAGNOSIS: GI BLEED    OUTPATIENT/OBSERVATION GOALS TO BE MET BEFORE DISCHARGE  Orthostatic performed: No    Stable Hgb Yes.   Recent Labs   Lab Test 08/03/21  1803 08/03/21  1500 08/03/21  0743   HGB 12.4 12.2 12.5       Resolved or declined bleeding episodes: Yes Last episode: LANA    Appropriate testing complete: Yes    Cleared for discharge by consultants (if involved): Yes    Safe discharge environment identified: Yes    Discharge Planner Nurse   Safe discharge environment identified: Yes  Barriers to discharge: Hgb recheck in AM.        Entered by: Sadie Dao 08/03/2021 9:44 PM     Please review provider order for any additional goals.   Nurse to notify provider when observation goals have been met and patient is ready for discharge.

## 2021-08-04 NOTE — PROVIDER NOTIFICATION
Provider text paged at 4262 d/t patient experiencing continued burning sensation in center of chest.     -Patient turned on call light asking for a emesis bag. PRN IV Zofran given for mild nausea but patient states she feels a burning sensation in center of her chest. PRN Tums administered with a little relief. Provider notified.

## 2021-08-04 NOTE — PROGRESS NOTES
Gastroenterology    Spoke with patient.  She is comfortable with plans for outpatient EGD early afternoon.  Patient has not had any bowel movements overnight.  Hemoglobin stable.  She has no complaints and feels ready for discharge.  Our office has been in touch with her about today's procedure.  She is aware that she will need a  to take her home from the outpatient EGD.    Please let me know if I can be of any other assistance prior to discharge.    Thank you for the opportunity to work with this patient.    Thank you.  Jatin Craig PA-C   [facility]   GI

## 2021-10-11 ENCOUNTER — HEALTH MAINTENANCE LETTER (OUTPATIENT)
Age: 61
End: 2021-10-11

## 2021-10-20 ENCOUNTER — OFFICE VISIT (OUTPATIENT)
Dept: PODIATRY | Facility: CLINIC | Age: 61
End: 2021-10-20
Payer: COMMERCIAL

## 2021-10-20 VITALS — BODY MASS INDEX: 23.39 KG/M2 | HEART RATE: 71 BPM | HEIGHT: 64 IN | OXYGEN SATURATION: 98 % | WEIGHT: 137 LBS

## 2021-10-20 DIAGNOSIS — R23.4 FISSURED SKIN: ICD-10-CM

## 2021-10-20 DIAGNOSIS — B35.3 TINEA PEDIS OF RIGHT FOOT: Primary | ICD-10-CM

## 2021-10-20 PROCEDURE — 99203 OFFICE O/P NEW LOW 30 MIN: CPT | Performed by: PODIATRIST

## 2021-10-20 RX ORDER — ALBUTEROL SULFATE 0.83 MG/ML
2.5 SOLUTION RESPIRATORY (INHALATION)
COMMUNITY
Start: 2020-09-17 | End: 2021-10-22

## 2021-10-20 RX ORDER — CLOTRIMAZOLE AND BETAMETHASONE DIPROPIONATE 10; .64 MG/G; MG/G
CREAM TOPICAL 2 TIMES DAILY
Qty: 45 G | Refills: 0 | Status: SHIPPED | OUTPATIENT
Start: 2021-10-20 | End: 2021-10-22

## 2021-10-20 RX ORDER — OMEPRAZOLE 20 MG/1
20 TABLET, DELAYED RELEASE ORAL
COMMUNITY
Start: 2019-11-05 | End: 2021-10-22

## 2021-10-20 RX ORDER — AMMONIUM LACTATE 12 G/100G
CREAM TOPICAL 2 TIMES DAILY
Qty: 385 G | Refills: 3 | Status: SHIPPED | OUTPATIENT
Start: 2021-10-20 | End: 2021-10-22

## 2021-10-20 ASSESSMENT — MIFFLIN-ST. JEOR: SCORE: 1166.43

## 2021-10-20 ASSESSMENT — PAIN SCALES - GENERAL: PAINLEVEL: NO PAIN (1)

## 2021-10-20 NOTE — LETTER
[facility]  [address] [id number] [phone]    2021    Re: Daria Pederson  [address] [id number]  [phone] (home)     : 5/10/1957      To Whom It May Concern:      Daria Sanchezry was seen in clinic 10/20/2021    Sincerely,        Tyler Kaufman DPM,

## 2021-10-20 NOTE — LETTER
10/20/2021         RE: Daria Pederson  [address] [address]        Dear Colleague,    Thank you for referring your patient, Daria Pederson, to the [facility]. Please see a copy of my visit note below.    FOOT AND ANKLE SURGERY/PODIATRY CONSULT NOTE         ASSESSMENT:   Fissured skin bilateral heels  Tinea pedis right foot      TREATMENT:  The patient was given a prescription for Lotrisone to be applied to the right foot twice daily.  She may use the cream for 4 weeks.  If the infection does not resolve she is to return to the clinic for follow-up visit.  Have also recommended AmLactin lotion to be applied to both feet daily.        HPI:Daria Pederson presented to the clinic today complaining of dry, scaly, itchy rash involving her right foot.  She stated she has had this for at least 6 months.  She has been using over-the-counter medications such as Tinactin with very little relief.  She has itching, burning on the bottom of her right foot  and it is now spread between the toes of her right foot.  There are no factors which give her any relief.  She has not had any drainage or bleeding.  The patient also complained of thick cracking skin on both heels.  She stated this can be quite painful at times.  She does try to trim the calluses down but this gives her very limited relief.    No past medical history on file.    Social History     Socioeconomic History     Marital status:      Spouse name: Not on file     Number of children: Not on file     Years of education: Not on file     Highest education level: Not on file   Occupational History     Not on file   Tobacco Use     Smoking status: Current Every Day Smoker     Packs/day: 1.00     Types: Cigarettes     Smokeless tobacco: Current User   Substance and Sexual Activity     Alcohol use: Not on file     Drug use: Not on file     Sexual activity: Not on file   Other Topics Concern     Not on  file   Social History Narrative     Not on file     Social Determinants of Health     Financial Resource Strain:      Difficulty of Paying Living Expenses:    Food Insecurity:      Worried About Running Out of Food in the Last Year:      Ran Out of Food in the Last Year:    Transportation Needs:      Lack of Transportation (Medical):      Lack of Transportation (Non-Medical):    Physical Activity:      Days of Exercise per Week:      Minutes of Exercise per Session:    Stress:      Feeling of Stress :    Social Connections:      Frequency of Communication with Friends and Family:      Frequency of Social Gatherings with Friends and Family:      Attends Scientologist Services:      Active Member of Clubs or Organizations:      Attends Club or Organization Meetings:      Marital Status:    Intimate Partner Violence:      Fear of Current or Ex-Partner:      Emotionally Abused:      Physically Abused:      Sexually Abused:         No Known Allergies     No current outpatient medications on file.     No family history on file.     Social History     Socioeconomic History     Marital status:      Spouse name: Not on file     Number of children: Not on file     Years of education: Not on file     Highest education level: Not on file   Occupational History     Not on file   Tobacco Use     Smoking status: Not on file   Substance and Sexual Activity     Alcohol use: Not on file     Drug use: Not on file     Sexual activity: Not on file   Other Topics Concern     Not on file   Social History Narrative     Not on file     Social Determinants of Health     Financial Resource Strain:      Difficulty of Paying Living Expenses:    Food Insecurity:      Worried About Running Out of Food in the Last Year:      Ran Out of Food in the Last Year:    Transportation Needs:      Lack of Transportation (Medical):      Lack of Transportation (Non-Medical):    Physical Activity:      Days of Exercise per Week:      Minutes of Exercise per  Session:    Stress:      Feeling of Stress :    Social Connections:      Frequency of Communication with Friends and Family:      Frequency of Social Gatherings with Friends and Family:      Attends Synagogue Services:      Active Member of Clubs or Organizations:      Attends Club or Organization Meetings:      Marital Status:    Intimate Partner Violence:      Fear of Current or Ex-Partner:      Emotionally Abused:      Physically Abused:      Sexually Abused:         Review of Systems - Patient denies fever, chills, rash, wound, stiffness, limping, numbness, weakness, heart burn, blood in stool, chest pain with activity, calf pain when walking, shortness of breath with activity, chronic cough, easy bleeding/bruising, swelling of ankles, excessive thirst, fatigue, depression, anxiety.  Patient admits to dry, scaly, itching skin right foot.      OBJECTIVE:  Appearance: alert, well appearing, and in no distress.    There were no vitals taken for this visit.     There is no height or weight on file to calculate BMI.     General appearance: Patient is alert and fully cooperative with history & exam.  No sign of distress is noted during the visit.  Psychiatric: Affect is pleasant & appropriate.  Patient appears motivated to improve health.  Respiratory: Breathing is regular & unlabored while sitting.  HEENT: Hearing is intact to spoken word.  Speech is clear.  No gross evidence of visual impairment that would impact ambulation.    Vascular: Dorsalis pedis and posterior tibial pulses are palpable. There is pedal hair growth bilaterally.  CFT < 3 sec from anterior tibial surface to distal digits bilaterally. There is no appreciable edema noted.  Dermatologic: Dry, scaly lesions on the plantar aspect of the right foot and interdigital spaces right foot.  There is also a thick hyperkeratotic tissue both heels.  There are cracks in the skin both heels.  Turgor and texture are within normal limits. No coloration or  temperature changes. No primary or secondary lesions noted.  Neurologic: All epicritic and proprioceptive sensations are grossly intact bilaterally.  Musculoskeletal: All active and passive ankle, subtalar, midtarsal, and 1st MPJ range of motion are grossly intact without pain or crepitus, with the exception of none. Manual muscle strength is within normal limits bilaterally. All dorsiflexors, plantarflexors, invertors, evertors are intact bilaterally. Tenderness present to the plantar aspect of the right foot and both heels on palpation.  No tenderness to bilateral feet or ankles with range of motion. Calf is soft/non-tender without warmth/induration    Imaging:       No images are attached to the encounter or orders placed in the encounter.     No results found.   No results found.       Tlyer España DPM  [facility]         Again, thank you for allowing me to participate in the care of your patient.        Sincerely,        Tyler Kaufman DPM

## 2021-10-20 NOTE — PROGRESS NOTES
FOOT AND ANKLE SURGERY/PODIATRY CONSULT NOTE         ASSESSMENT:   Fissured skin bilateral heels  Tinea pedis right foot      TREATMENT:  The patient was given a prescription for Lotrisone to be applied to the right foot twice daily.  She may use the cream for 4 weeks.  If the infection does not resolve she is to return to the clinic for follow-up visit.  Have also recommended AmLactin lotion to be applied to both feet daily.        HPI:Daria Pederson presented to the clinic today complaining of dry, scaly, itchy rash involving her right foot.  She stated she has had this for at least 6 months.  She has been using over-the-counter medications such as Tinactin with very little relief.  She has itching, burning on the bottom of her right foot  and it is now spread between the toes of her right foot.  There are no factors which give her any relief.  She has not had any drainage or bleeding.  The patient also complained of thick cracking skin on both heels.  She stated this can be quite painful at times.  She does try to trim the calluses down but this gives her very limited relief.    No past medical history on file.    Social History     Socioeconomic History     Marital status:      Spouse name: Not on file     Number of children: Not on file     Years of education: Not on file     Highest education level: Not on file   Occupational History     Not on file   Tobacco Use     Smoking status: Current Every Day Smoker     Packs/day: 1.00     Types: Cigarettes     Smokeless tobacco: Current User   Substance and Sexual Activity     Alcohol use: Not on file     Drug use: Not on file     Sexual activity: Not on file   Other Topics Concern     Not on file   Social History Narrative     Not on file     Social Determinants of Health     Financial Resource Strain:      Difficulty of Paying Living Expenses:    Food Insecurity:      Worried About Running Out of Food in the Last Year:      Ran Out of Food in the Last  Year:    Transportation Needs:      Lack of Transportation (Medical):      Lack of Transportation (Non-Medical):    Physical Activity:      Days of Exercise per Week:      Minutes of Exercise per Session:    Stress:      Feeling of Stress :    Social Connections:      Frequency of Communication with Friends and Family:      Frequency of Social Gatherings with Friends and Family:      Attends Sikh Services:      Active Member of Clubs or Organizations:      Attends Club or Organization Meetings:      Marital Status:    Intimate Partner Violence:      Fear of Current or Ex-Partner:      Emotionally Abused:      Physically Abused:      Sexually Abused:         No Known Allergies     No current outpatient medications on file.     No family history on file.     Social History     Socioeconomic History     Marital status:      Spouse name: Not on file     Number of children: Not on file     Years of education: Not on file     Highest education level: Not on file   Occupational History     Not on file   Tobacco Use     Smoking status: Not on file   Substance and Sexual Activity     Alcohol use: Not on file     Drug use: Not on file     Sexual activity: Not on file   Other Topics Concern     Not on file   Social History Narrative     Not on file     Social Determinants of Health     Financial Resource Strain:      Difficulty of Paying Living Expenses:    Food Insecurity:      Worried About Running Out of Food in the Last Year:      Ran Out of Food in the Last Year:    Transportation Needs:      Lack of Transportation (Medical):      Lack of Transportation (Non-Medical):    Physical Activity:      Days of Exercise per Week:      Minutes of Exercise per Session:    Stress:      Feeling of Stress :    Social Connections:      Frequency of Communication with Friends and Family:      Frequency of Social Gatherings with Friends and Family:      Attends Sikh Services:      Active Member of Clubs or Organizations:       Attends Club or Organization Meetings:      Marital Status:    Intimate Partner Violence:      Fear of Current or Ex-Partner:      Emotionally Abused:      Physically Abused:      Sexually Abused:         Review of Systems - Patient denies fever, chills, rash, wound, stiffness, limping, numbness, weakness, heart burn, blood in stool, chest pain with activity, calf pain when walking, shortness of breath with activity, chronic cough, easy bleeding/bruising, swelling of ankles, excessive thirst, fatigue, depression, anxiety.  Patient admits to dry, scaly, itching skin right foot.      OBJECTIVE:  Appearance: alert, well appearing, and in no distress.    There were no vitals taken for this visit.     There is no height or weight on file to calculate BMI.     General appearance: Patient is alert and fully cooperative with history & exam.  No sign of distress is noted during the visit.  Psychiatric: Affect is pleasant & appropriate.  Patient appears motivated to improve health.  Respiratory: Breathing is regular & unlabored while sitting.  HEENT: Hearing is intact to spoken word.  Speech is clear.  No gross evidence of visual impairment that would impact ambulation.    Vascular: Dorsalis pedis and posterior tibial pulses are palpable. There is pedal hair growth bilaterally.  CFT < 3 sec from anterior tibial surface to distal digits bilaterally. There is no appreciable edema noted.  Dermatologic: Dry, scaly lesions on the plantar aspect of the right foot and interdigital spaces right foot.  There is also a thick hyperkeratotic tissue both heels.  There are cracks in the skin both heels.  Turgor and texture are within normal limits. No coloration or temperature changes. No primary or secondary lesions noted.  Neurologic: All epicritic and proprioceptive sensations are grossly intact bilaterally.  Musculoskeletal: All active and passive ankle, subtalar, midtarsal, and 1st MPJ range of motion are grossly intact without  pain or crepitus, with the exception of none. Manual muscle strength is within normal limits bilaterally. All dorsiflexors, plantarflexors, invertors, evertors are intact bilaterally. Tenderness present to the plantar aspect of the right foot and both heels on palpation.  No tenderness to bilateral feet or ankles with range of motion. Calf is soft/non-tender without warmth/induration    Imaging:       No images are attached to the encounter or orders placed in the encounter.     No results found.   No results found.       Tyler España DPM  [facility]/Podiatry

## 2021-10-22 ENCOUNTER — DOCUMENTATION ONLY (OUTPATIENT)
Dept: PODIATRY | Facility: CLINIC | Age: 61
End: 2021-10-22

## 2021-10-22 NOTE — PROGRESS NOTES
Patient was seen in office by Dr. Kaufman on 10/20/2021 at [facility]. Patient was prescribed Amlactin and Lotrisone at that time. Pharmacy did not receive the order. Dr. Kaufman called prescription in yesterday to pharmacy.

## 2021-10-26 ENCOUNTER — NURSE TRIAGE (OUTPATIENT)
Dept: NURSING | Facility: CLINIC | Age: 61
End: 2021-10-26

## 2021-10-27 DIAGNOSIS — B35.3 TINEA PEDIS OF RIGHT FOOT: Primary | ICD-10-CM

## 2021-10-27 DIAGNOSIS — R23.4 FISSURED SKIN: ICD-10-CM

## 2021-10-27 RX ORDER — AMMONIUM LACTATE 12 G/100G
CREAM TOPICAL 2 TIMES DAILY
Qty: 385 G | Refills: 1 | Status: SHIPPED | OUTPATIENT
Start: 2021-10-27

## 2021-10-27 RX ORDER — CLOTRIMAZOLE AND BETAMETHASONE DIPROPIONATE 10; .64 MG/G; MG/G
CREAM TOPICAL 2 TIMES DAILY
Qty: 45 G | Refills: 0 | Status: SHIPPED | OUTPATIENT
Start: 2021-10-27

## 2021-10-27 NOTE — TELEPHONE ENCOUNTER
Patient states she is having a problem getting her 2 prescription filled.   Seen last Weds by Dr Tyler Kaufman for podiatry at the   [facility] ED office.   RX: for athletes foot, topical cream x2 different medications. It was to have been sent to [facility].   She thinks it may have been sent under the wrong patient: similar name (incorrect=Daria Pederson) and correct address and phone number but incorrect birthdate. (5/10/1957). Pharmacy will not dispense the prescriptions because of this.     Medications do not appear on patient's current medication list, however per chart review= Lotrisone and AmLactin are written on the office note per Dr Kaufman.     Message will be forwarded to Dr Kaufman so that can resend the prescriptions with corrected information to [facility] in [place].     Angelica Daily RN Triage Nurse Advisor 11:15 PM 10/26/2021    Reason for Disposition    [1] Caller requesting a NON-URGENT new prescription or refill AND [2] triager unable to refill per unit policy    Additional Information    Negative: Drug overdose and triager unable to answer question    Negative: Caller requesting information unrelated to medicine    Negative: Caller requesting a prescription for Strep throat and has a positive culture result    Negative: Rash while taking a medication or within 3 days of stopping it    Negative: Immunization reaction suspected    Negative: [1] Asthma and [2] having symptoms of asthma (cough, wheezing, etc.)    Negative: [1] Influenza symptoms AND [2] anti-viral med prescription request, such as Tamiflu    Negative: [1] Symptom of illness (e.g., headache, abdominal pain, earache, vomiting) AND [2] more than mild    Negative: MORE THAN A DOUBLE DOSE of a prescription or over-the-counter (OTC) drug    Negative: [1] DOUBLE DOSE (an extra dose or lesser amount) of over-the-counter (OTC) drug AND [2] any symptoms (e.g., dizziness, nausea, pain, sleepiness)    Negative: [1] DOUBLE DOSE (an  "extra dose or lesser amount) of prescription drug AND [2] any symptoms (e.g., dizziness, nausea, pain, sleepiness)    Negative: Took another person's prescription drug    Negative: [1] DOUBLE DOSE (an extra dose or lesser amount) of prescription drug AND [2] NO symptoms (Exception: a double dose of antibiotics)    Negative: Diabetes drug error or overdose (e.g., took wrong type of insulin or took extra dose)    Negative: [1] Request for URGENT new prescription or refill of \"essential\" medication (i.e., likelihood of harm to patient if not taken) AND [2] triager unable to fill per unit policy    Negative: [1] Prescription not at pharmacy AND [2] was prescribed by PCP recently    Negative: [1] Pharmacy calling with prescription questions AND [2] triager unable to answer question    Negative: [1] Caller has URGENT medication question about med that PCP or specialist prescribed AND [2] triager unable to answer question    Negative: [1] Caller has NON-URGENT medication question about med that PCP prescribed AND [2] triager unable to answer question    Protocols used: MEDICATION QUESTION CALL-A-AH      "

## 2021-12-02 ENCOUNTER — HOSPITAL ENCOUNTER (OUTPATIENT)
Dept: MAMMOGRAPHY | Facility: CLINIC | Age: 61
Discharge: HOME | End: 2021-12-02
Attending: OBSTETRICS & GYNECOLOGY | Admitting: OBSTETRICS & GYNECOLOGY
Payer: COMMERCIAL

## 2021-12-02 DIAGNOSIS — Z12.31 VISIT FOR SCREENING MAMMOGRAM: ICD-10-CM

## 2021-12-02 PROCEDURE — 77067 SCR MAMMO BI INCL CAD: CPT

## 2022-05-10 ENCOUNTER — HOSPITAL ENCOUNTER (EMERGENCY)
Facility: CLINIC | Age: 62
Discharge: HOME | End: 2022-05-10
Attending: EMERGENCY MEDICINE | Admitting: EMERGENCY MEDICINE

## 2022-05-10 ENCOUNTER — APPOINTMENT (OUTPATIENT)
Dept: RADIOLOGY | Facility: CLINIC | Age: 62
End: 2022-05-10
Attending: EMERGENCY MEDICINE

## 2022-05-10 VITALS
TEMPERATURE: 97.4 F | DIASTOLIC BLOOD PRESSURE: 75 MMHG | HEART RATE: 70 BPM | OXYGEN SATURATION: 98 % | HEIGHT: 64 IN | WEIGHT: 137 LBS | SYSTOLIC BLOOD PRESSURE: 146 MMHG | BODY MASS INDEX: 23.39 KG/M2 | RESPIRATION RATE: 19 BRPM

## 2022-05-10 DIAGNOSIS — S86.911A KNEE STRAIN, RIGHT, INITIAL ENCOUNTER: ICD-10-CM

## 2022-05-10 PROCEDURE — 99284 EMERGENCY DEPT VISIT MOD MDM: CPT | Mod: 25

## 2022-05-10 PROCEDURE — 29505 APPLICATION LONG LEG SPLINT: CPT | Mod: RT

## 2022-05-10 PROCEDURE — 73562 X-RAY EXAM OF KNEE 3: CPT | Mod: RT

## 2022-05-10 PROCEDURE — 250N000013 HC RX MED GY IP 250 OP 250 PS 637: Performed by: EMERGENCY MEDICINE

## 2022-05-10 RX ORDER — OXYCODONE AND ACETAMINOPHEN 5; 325 MG/1; MG/1
1 TABLET ORAL ONCE
Status: COMPLETED | OUTPATIENT
Start: 2022-05-10 | End: 2022-05-10

## 2022-05-10 RX ORDER — OXYCODONE AND ACETAMINOPHEN 5; 325 MG/1; MG/1
1 TABLET ORAL EVERY 6 HOURS PRN
Qty: 12 TABLET | Refills: 0 | Status: SHIPPED | OUTPATIENT
Start: 2022-05-10 | End: 2022-05-13

## 2022-05-10 RX ADMIN — OXYCODONE HYDROCHLORIDE AND ACETAMINOPHEN 1 TABLET: 5; 325 TABLET ORAL at 10:57

## 2022-05-10 ASSESSMENT — ENCOUNTER SYMPTOMS
NUMBNESS: 0
BACK PAIN: 0
NECK PAIN: 0
ARTHRALGIAS: 1

## 2022-05-10 NOTE — ED TRIAGE NOTES
Fall off ladder in the garage last night. Right knee pain.      Triage Assessment     Row Name 05/10/22 0907       Triage Assessment (Adult)    Airway WDL WDL       Respiratory WDL    Respiratory WDL WDL       Skin Circulation/Temperature WDL    Skin Circulation/Temperature WDL WDL       Cardiac WDL    Cardiac WDL WDL       Peripheral/Neurovascular WDL    Peripheral Neurovascular WDL WDL       Cognitive/Neuro/Behavioral WDL    Cognitive/Neuro/Behavioral WDL WDL

## 2022-05-10 NOTE — DISCHARGE INSTRUCTIONS
Knee immobilizer and crutches for the next 1 to 2 weeks.  See your clinic if not better in 1 to 2 weeks.  Elevate leg whenever possible.  Ice off-and-on whenever hurting.  Over-the-counter Tylenol or ibuprofen as tolerated every 6 hours for pain.  1 Percocet every 6 hours instead only if needed for stronger pain.  Do not drive with this.  Have someone drive you home today.

## 2022-05-10 NOTE — ED PROVIDER NOTES
EMERGENCY DEPARTMENT ENCOUNTER      NAME: Sada Pederson  AGE: 61 year old female  YOB: 1960  MRN: [id number]  EVALUATION DATE & TIME: No admission date for patient encounter.    PCP: Jose Ochoa    ED PROVIDER: Jarrell Murcia M.D.      Chief Complaint   Patient presents with     Knee Pain         FINAL IMPRESSION:  1.  Acute right knee strain.      ED COURSE & MEDICAL DECISION MAKING:    10:03 AM I met with the patient to gather history and to perform my initial exam. We discussed plans for the ED course, including diagnostic testing and treatment. PPE worn: cloth mask.  Patient x-ray from triage ordered showing no fracture or dislocation.  There is a large joint effusion but otherwise negative.  10:05 AM Updated patient on results and discussed discharge. Patient is agreeable with the plan.  Plan knee immobilizer, pain medicine, crutches, and clinic follow-up.      Pertinent Labs & Imaging studies reviewed. (See chart for details)    61 year old female presents to the Emergency Department for evaluation of right knee pain.    At the conclusion of the encounter I discussed the results of all of the tests and the disposition. The questions were answered. The patient or family acknowledged understanding and was agreeable with the care plan.                MEDICATIONS GIVEN IN THE EMERGENCY:  Medications - No data to display    NEW PRESCRIPTIONS STARTED AT TODAY'S ER VISIT  New Prescriptions    No medications on file          =================================================================    HPI    Patient information was obtained from: Patient    Use of : N/A         Sada Pederson is a 61 year old female with no pertinent history who presents to this ED by walk in for evaluation of knee pain.    Patient reports yesterday she fell 5-6 feet off of a ladder and endorses right knee pain radiating up her leg. She is unsure how she fell but denies any head trauma, loss of  consciousness, neck pain, back pain, or additional injuries. Distal motor and sensation intact. Patient was driven to ED today.    Patient does not identify any waxing or waning symptoms otherwise, exacerbating or alleviating features,associated symptoms except as mentioned. She denies any additional pain related complaints.    REVIEW OF SYSTEMS   Review of Systems   HENT:        Negative for head trauma.   Musculoskeletal: Positive for arthralgias (right knee). Negative for back pain and neck pain.   Neurological: Negative for numbness.        Negative for loss of consciousness.   All other systems reviewed and are negative.     PAST MEDICAL HISTORY:  History reviewed. No pertinent past medical history.    PAST SURGICAL HISTORY:  Past Surgical History:   Procedure Laterality Date     CHOLECYSTECTOMY       SHOULDER ARTHROSCOPY W/ ACROMIAL REPAIR Right            CURRENT MEDICATIONS:    ammonium lactate (AMLACTIN) 12 % external cream  clotrimazole-betamethasone (LOTRISONE) 1-0.05 % external cream  diphenhydrAMINE (BENADRYL) 25 MG tablet  ferrous sulfate (FEROSUL) 325 (65 Fe) MG tablet  FLUoxetine (PROZAC) 40 MG capsule  MULTIVIT WITH MINERALS/LUTEIN (MULTIVITAMIN 50 PLUS ORAL)        ALLERGIES:  Allergies   Allergen Reactions     Codeine Nausea and Vomiting       FAMILY HISTORY:  Family History   Problem Relation Age of Onset     Dementia Mother 78.00     Depression Son      Diabetes No family hx of      Heart Disease No family hx of      Cerebrovascular Disease No family hx of        SOCIAL HISTORY:   Social History     Socioeconomic History     Marital status:      Spouse name: None     Number of children: 1     Years of education: None     Highest education level: None   Tobacco Use     Smoking status: Current Every Day Smoker     Packs/day: 0.50     Years: 40.00     Pack years: 20.00     Types: Cigarettes, Cigarettes     Smokeless tobacco: Never Used   Substance and Sexual Activity     Alcohol use: Yes      "Alcohol/week: 14.0 standard drinks     Drug use: Yes     Types: Cocaine   Social History Narrative    Her  has been intermittently incarcerated over the last 20 years. This was due to a \"business problem\" in another state.   History of daily smoking, heavy alcohol consumption, and cocaine abuse.    VITALS:  BP (!) 146/75   Pulse 70   Temp 97.4  F (36.3  C)   Resp 19   Ht 1.626 m (5' 4\")   Wt 62.1 kg (137 lb)   SpO2 98%   BMI 23.52 kg/m      PHYSICAL EXAM    Vital Signs:  BP (!) 146/75   Pulse 70   Temp 97.4  F (36.3  C)   Resp 19   Ht 1.626 m (5' 4\")   Wt 62.1 kg (137 lb)   SpO2 98%   BMI 23.52 kg/m    General:  On entering the room  is in no apparent distress.    Neck:  Neck supple with full range of motion and nontender.    Back:  Back and spine are nontender.  No costovertebral angle tenderness.    HEENT:  Oropharynx clear with moist mucous membranes.  HEENT unremarkable.    Pulmonary:  Chest clear to auscultation without rhonchi rales or wheezing.    Cardiovascular:  Cardiac regular rate and rhythm without murmurs rubs or gallops.    Abdomen:  Abdomen soft nontender.  There is no rebound or guarding.    Muskuloskeletal:  she moves all 4 without any difficulty and has normal neurovascular exams.  Extremities without clubbing, cyanosis, or edema.  Legs and calves are nontender.  Full range of motion of the knee but swelling anteriorly and pain mostly anteriorly.  Negative Lachemann's test.  Negative anterior posterior drawer signs.  Sensation intact soft touch in the leg.  Good pulse capillary fill.  Neuro:  she is alert and oriented ×3 and moves all extremities symmetrically.    Psych:  Normal affect.    Skin:  Unremarkable and warm and dry.       LAB:  All pertinent labs reviewed and interpreted.  Labs Ordered and Resulted from Time of ED Arrival to Time of ED Departure - No data to display    RADIOLOGY:  Reviewed all pertinent imaging. Please see official radiology report.  XR Knee Right 3 " Views   Final Result   IMPRESSION: Moderate to large knee joint effusion. No acute fracture or malalignment. There is normal joint spacing.                 EKG:          PROCEDURES:         I, Lori Blanchard, am serving as a scribe to document services personally performed by Dr. Murcia based on my observation and the provider's statements to me. I, Jarrell Murcia MD attest that Lori Blanchard is acting in a scribe capacity, has observed my performance of the services and has documented them in accordance with my direction.    Jarrell Murcia M.D.  Emergency Medicine  [facility] EMERGENCY ROOM  [address] [address]  [phone]  Dept: [phone]     Jarrell Murcia MD  05/10/22 1012

## 2022-09-24 ENCOUNTER — HEALTH MAINTENANCE LETTER (OUTPATIENT)
Age: 62
End: 2022-09-24

## 2023-01-29 ENCOUNTER — HEALTH MAINTENANCE LETTER (OUTPATIENT)
Age: 63
End: 2023-01-29

## 2024-03-02 ENCOUNTER — HEALTH MAINTENANCE LETTER (OUTPATIENT)
Age: 64
End: 2024-03-02

## 2025-03-15 ENCOUNTER — HEALTH MAINTENANCE LETTER (OUTPATIENT)
Age: 65
End: 2025-03-15